# Patient Record
Sex: FEMALE | Race: WHITE | Employment: FULL TIME | URBAN - METROPOLITAN AREA
[De-identification: names, ages, dates, MRNs, and addresses within clinical notes are randomized per-mention and may not be internally consistent; named-entity substitution may affect disease eponyms.]

---

## 2017-03-08 ENCOUNTER — HOSPITAL ENCOUNTER (OUTPATIENT)
Dept: MAMMOGRAPHY | Age: 57
Discharge: HOME | End: 2017-03-08
Payer: COMMERCIAL

## 2017-03-08 DIAGNOSIS — Z12.31 VISIT FOR SCREENING MAMMOGRAM: ICD-10-CM

## 2017-03-08 PROCEDURE — 77067 SCR MAMMO BI INCL CAD: CPT

## 2017-03-08 PROCEDURE — 77063 BREAST TOMOSYNTHESIS BI: CPT

## 2017-03-15 DIAGNOSIS — E78.00 PURE HYPERCHOLESTEROLEMIA: ICD-10-CM

## 2017-03-15 DIAGNOSIS — F32.0 MILD SINGLE CURRENT EPISODE OF MAJOR DEPRESSIVE DISORDER (HCC): ICD-10-CM

## 2017-03-15 DIAGNOSIS — K58.0 IRRITABLE BOWEL SYNDROME WITH DIARRHEA: ICD-10-CM

## 2017-03-15 DIAGNOSIS — E11.21 CONTROLLED TYPE 2 DIABETES MELLITUS WITH DIABETIC NEPHROPATHY, WITHOUT LONG-TERM CURRENT USE OF INSULIN (HCC): Primary | ICD-10-CM

## 2017-03-15 LAB
ABSOLUTE LYMPHOCYTE COUNT, 10803: 2 K/UL (ref 1–4.8)
AVG GLU, 10930: 161 MG/DL (ref 91–123)
BASOPHILS # BLD: 0 K/UL (ref 0–0.2)
BASOPHILS NFR BLD: 0 % (ref 0–2)
BILIRUB UR QL: NEGATIVE
CREATININE, URINE: 108 MG/DL
CREATININE, URINE: 108 MG/DL
EOSINOPHIL # BLD: 0.2 K/UL (ref 0–0.5)
EOSINOPHIL NFR BLD: 2 % (ref 0–6)
EPITHELIAL,EPSU: ABNORMAL /HPF (ref 0–2)
ERYTHROCYTE [DISTWIDTH] IN BLOOD BY AUTOMATED COUNT: 13.6 % (ref 10–16)
GLUCOSE UR QL: NEGATIVE MG/DL
GRANULOCYTES,GRANS: 72 % (ref 40–75)
HBA1C MFR BLD HPLC: 7.2 % (ref 4.8–5.9)
HCT VFR BLD AUTO: 45.9 % (ref 35.1–48)
HGB BLD-MCNC: 14.4 G/DL (ref 11.7–16)
HGB UR QL STRIP: NEGATIVE
KETONES UR QL STRIP.AUTO: NEGATIVE MG/DL
LEUKOCYTE ESTERASE: ABNORMAL
LYMPHOCYTES, LYMLT: 20 % (ref 27–45)
MCH RBC QN AUTO: 31 PG (ref 26–34)
MCHC RBC AUTO-ENTMCNC: 31 G/DL (ref 32–36)
MCV RBC AUTO: 99 FL (ref 80–95)
MICROALB/CREAT RATIO, 140286: NORMAL MCG/MG OF CREATININE (ref 0–30)
MICROALBUMIN,URINE RANDOM 140054: <12 UG/ML (ref 0.1–17)
MONOCYTES # BLD: 0.7 K/UL (ref 0.1–0.9)
MONOCYTES NFR BLD: 7 % (ref 3–9)
NEUTROPHILS # BLD AUTO: 7.3 K/UL (ref 1.8–7.7)
NITRITE UR QL STRIP.AUTO: NEGATIVE
PH UR STRIP: 5 PH (ref 5–8)
PLATELET # BLD AUTO: 216 K/UL (ref 140–440)
PMV BLD AUTO: 12.2 FL (ref 6–10.8)
PROT UR QL STRIP: NEGATIVE MG/DL
RBC # BLD AUTO: 4.62 M/UL (ref 3.8–5.2)
RBC #/AREA URNS HPF: NEGATIVE /HPF
SP GR UR: 1.02 (ref 1–1.03)
UROBILINOGEN UR STRIP-MCNC: <2 MG/DL
WBC # BLD AUTO: 10.1 K/UL (ref 4–11)
WBC URNS QL MICRO: ABNORMAL /HPF (ref 0–2)

## 2017-03-16 ENCOUNTER — TELEPHONE (OUTPATIENT)
Dept: FAMILY MEDICINE CLINIC | Age: 57
End: 2017-03-16

## 2017-03-16 LAB
25(OH)D3 SERPL-MCNC: 32.7 NG/ML (ref 32–100)
A-G RATIO,AGRAT: 1.9 RATIO (ref 1.1–2.6)
ALBUMIN SERPL-MCNC: 4.6 G/DL (ref 3.5–5)
ALP SERPL-CCNC: 96 U/L (ref 25–115)
ALT SERPL-CCNC: 21 U/L (ref 5–40)
ANION GAP SERPL CALC-SCNC: 19 MMOL/L
AST SERPL W P-5'-P-CCNC: 16 U/L (ref 10–37)
BILIRUB SERPL-MCNC: 0.5 MG/DL (ref 0.2–1.2)
BUN SERPL-MCNC: 8 MG/DL (ref 6–22)
CALCIUM SERPL-MCNC: 9.6 MG/DL (ref 8.4–10.5)
CHLORIDE SERPL-SCNC: 103 MMOL/L (ref 98–110)
CHOLEST SERPL-MCNC: 154 MG/DL (ref 110–200)
CO2 SERPL-SCNC: 22 MMOL/L (ref 20–32)
CREAT SERPL-MCNC: 0.9 MG/DL (ref 0.5–1.2)
GFRAA, 66117: >60
GFRNA, 66118: >60
GLOBULIN,GLOB: 2.4 G/DL (ref 2–4)
GLUCOSE SERPL-MCNC: 192 MG/DL (ref 65–99)
HCV AB SER IA-ACNC: POSITIVE
HDLC SERPL-MCNC: 34 MG/DL (ref 40–59)
LDLC SERPL CALC-MCNC: 81 MG/DL (ref 50–99)
POTASSIUM SERPL-SCNC: 4.7 MMOL/L (ref 3.5–5.5)
PROT SERPL-MCNC: 7 G/DL (ref 6.4–8.3)
SODIUM SERPL-SCNC: 144 MMOL/L (ref 133–145)
TRIGL SERPL-MCNC: 194 MG/DL (ref 40–149)
TSH SERPL DL<=0.005 MIU/L-ACNC: 1.41 MCU/ML (ref 0.27–4.2)
VLDLC SERPL CALC-MCNC: 39 MG/DL (ref 8–30)

## 2017-03-16 NOTE — TELEPHONE ENCOUNTER
Notify pt of abnormal labs  Don't get into discussion  Just let her know to discuss with me next wk  Dr Lana Chadwick

## 2017-03-22 ENCOUNTER — HOSPITAL ENCOUNTER (OUTPATIENT)
Dept: GENERAL RADIOLOGY | Age: 57
Discharge: HOME | End: 2017-03-22
Attending: FAMILY MEDICINE
Payer: COMMERCIAL

## 2017-03-22 DIAGNOSIS — K58.0 IRRITABLE BOWEL SYNDROME WITH DIARRHEA: ICD-10-CM

## 2017-03-22 DIAGNOSIS — E78.00 PURE HYPERCHOLESTEROLEMIA: ICD-10-CM

## 2017-03-22 DIAGNOSIS — F32.0 MILD SINGLE CURRENT EPISODE OF MAJOR DEPRESSIVE DISORDER (HCC): ICD-10-CM

## 2017-03-22 DIAGNOSIS — E11.21 CONTROLLED TYPE 2 DIABETES MELLITUS WITH DIABETIC NEPHROPATHY, WITHOUT LONG-TERM CURRENT USE OF INSULIN (HCC): ICD-10-CM

## 2017-03-22 PROCEDURE — 77080 DXA BONE DENSITY AXIAL: CPT

## 2017-03-23 ENCOUNTER — OFFICE VISIT (OUTPATIENT)
Dept: FAMILY MEDICINE CLINIC | Age: 57
End: 2017-03-23

## 2017-03-23 VITALS
BODY MASS INDEX: 30.83 KG/M2 | SYSTOLIC BLOOD PRESSURE: 123 MMHG | RESPIRATION RATE: 16 BRPM | DIASTOLIC BLOOD PRESSURE: 73 MMHG | HEART RATE: 78 BPM | TEMPERATURE: 97.7 F | WEIGHT: 203.4 LBS | HEIGHT: 68 IN | OXYGEN SATURATION: 95 %

## 2017-03-23 DIAGNOSIS — B18.2 HEP C W/O COMA, CHRONIC (HCC): ICD-10-CM

## 2017-03-23 DIAGNOSIS — Z00.00 ROUTINE GENERAL MEDICAL EXAMINATION AT A HEALTH CARE FACILITY: Primary | ICD-10-CM

## 2017-03-23 DIAGNOSIS — F32.0 MILD SINGLE CURRENT EPISODE OF MAJOR DEPRESSIVE DISORDER (HCC): ICD-10-CM

## 2017-03-23 DIAGNOSIS — E11.9 CONTROLLED TYPE 2 DIABETES MELLITUS WITHOUT COMPLICATION, WITHOUT LONG-TERM CURRENT USE OF INSULIN (HCC): ICD-10-CM

## 2017-03-23 DIAGNOSIS — E78.00 PURE HYPERCHOLESTEROLEMIA: ICD-10-CM

## 2017-03-23 DIAGNOSIS — K58.9 IRRITABLE BOWEL SYNDROME WITHOUT DIARRHEA: ICD-10-CM

## 2017-03-23 RX ORDER — METFORMIN HYDROCHLORIDE 500 MG/1
500 TABLET ORAL 2 TIMES DAILY WITH MEALS
Qty: 180 TAB | Refills: 4 | Status: SHIPPED | OUTPATIENT
Start: 2017-03-23 | End: 2017-09-25 | Stop reason: SDUPTHER

## 2017-03-23 RX ORDER — SERTRALINE HYDROCHLORIDE 100 MG/1
100 TABLET, FILM COATED ORAL DAILY
Qty: 90 TAB | Refills: 4 | Status: SHIPPED | OUTPATIENT
Start: 2017-03-23 | End: 2017-09-25 | Stop reason: SDUPTHER

## 2017-03-23 RX ORDER — AMITRIPTYLINE HYDROCHLORIDE 50 MG/1
50 TABLET, FILM COATED ORAL
Qty: 90 TAB | Refills: 4 | Status: SHIPPED | OUTPATIENT
Start: 2017-03-23 | End: 2017-09-25 | Stop reason: SDUPTHER

## 2017-03-23 RX ORDER — RANITIDINE 150 MG/1
150 TABLET, FILM COATED ORAL 2 TIMES DAILY
Qty: 180 TAB | Refills: 4 | Status: SHIPPED | OUTPATIENT
Start: 2017-03-23 | End: 2017-09-25 | Stop reason: SDUPTHER

## 2017-03-23 RX ORDER — SIMVASTATIN 20 MG/1
20 TABLET, FILM COATED ORAL
Qty: 90 TAB | Refills: 4 | Status: SHIPPED | OUTPATIENT
Start: 2017-03-23 | End: 2017-09-25 | Stop reason: SDUPTHER

## 2017-03-23 NOTE — PROGRESS NOTES
Quiana Kee is a 60 y.o.  female and presents for a preventive health care visit   Subjective:  Health Maintenance History  Immunizations reviewed, utd  indicated. Mammogram : utd nl , dexascan: utd nl ,pap smear : utd ,   colonoscopy: utd , Eye exam: utd ,  Chest CT: na ,    HPI ;    Patient Active Problem List    Diagnosis Date Noted    Pure hypercholesterolemia 02/16/2015    Diabetes mellitus type 2, controlled ([facility] [address] 02/16/2015    IBS (irritable bowel syndrome) 02/16/2015    Depression 02/16/2015     Current Outpatient Prescriptions   Medication Sig Dispense Refill    metFORMIN (GLUCOPHAGE) 500 mg tablet Take 1 Tab by mouth two (2) times daily (with meals). 180 Tab 4    amitriptyline (ELAVIL) 50 mg tablet Take 1 Tab by mouth nightly. 90 Tab 4    sertraline (ZOLOFT) 100 mg tablet Take 1 Tab by mouth daily. 90 Tab 4    simvastatin (ZOCOR) 20 mg tablet Take 1 Tab by mouth nightly. 90 Tab 4    raNITIdine (ZANTAC) 150 mg tablet Take 1 Tab by mouth two (2) times a day. 180 Tab 4    progesterone (PROMETRIUM) 200 mg capsule Take 200 mg by mouth daily.  OTHER,NON-FORMULARY, Indications: testosterone and estradio pellets subQ       Allergies   Allergen Reactions    Aspirin Other (comments)     Bleeding       Past Medical History:   Diagnosis Date    Anxiety and depression     Arthritis     Depression     Depression 2/16/2015    Endometriosis     Pure hypercholesterolemia 2/16/2015    Sinus congestion      Past Surgical History:   Procedure Laterality Date    HX CHOLECYSTECTOMY      HX CHOLECYSTECTOMY      HX CYST INCISION AND DRAINAGE      rt 2010????      Family History   Problem Relation Age of Onset    Heart Disease Mother     Hypertension Father     Heart Disease Father     Diabetes Father     Cancer Paternal Grandfather     Breast Cancer Other      cousin and niece     Social History   Substance Use Topics    Smoking status: Former Smoker    Smokeless tobacco: Never Used    Alcohol use 1.5 oz/week     3 drink(s) per week       ROS       General: negative for - chills, fatigue, fever, weight change  Psych: negative for - anxiety, depression, irritability or mood swings  ENT: negative for - headaches, hearing change, nasal congestion, oral lesions, sneezing or sore throat  Heme/ Lymph: negative for - bleeding problems, bruising, pallor or swollen lymph nodes  Endo: negative for - hot flashes, polydipsia/polyuria or temperature intolerance  Resp: negative for - cough, shortness of breath or wheezing  CV: negative for - chest pain, edema or palpitations  GI: negative for - abdominal pain, change in bowel habits, constipation, diarrhea or nausea/vomiting  : negative for - dysuria, hematuria, incontinence, pelvic pain or vulvar/vaginal symptoms  MSK: negative for - joint pain, joint swelling or muscle pain  Neuro: negative for - confusion, headaches, seizures or weakness  Derm: negative for - dry skin, hair changes, rash or skin lesion changes      Objective:  Vitals:    03/23/17 0730   BP: 123/73   Pulse: 78   Resp: 16   Temp: 97.7 °F (36.5 °C)   TempSrc: Oral   SpO2: 95%   Weight: 203 lb 6.4 oz (92.3 kg)   Height: 5' 8\" (1.727 m)   PainSc:   0 - No pain       alert, well appearing, and in no distress, oriented to person, place, and time and normal appearing weight  Mental status - alert, oriented to person, place, and time  Eyes - pupils equal and reactive, extraocular eye movements intact  Ears - bilateral TM's and external ear canals normal  Nose - normal and patent, no erythema, discharge or polyps  Mouth - mucous membranes moist, pharynx normal without lesions  Neck - supple, no significant adenopathy  Lymphatics - no palpable lymphadenopathy, no hepatosplenomegaly  Chest - clear to auscultation, no wheezes, rales or rhonchi, symmetric air entry  Heart - normal rate, regular rhythm, normal S1, S2, no murmurs, rubs, clicks or gallops  Abdomen - soft, nontender, nondistended, no masses or organomegaly  Back exam - full range of motion, no tenderness, palpable spasm or pain on motion  Neurological - alert, oriented, normal speech, no focal findings or movement disorder noted  Musculoskeletal - no joint tenderness, deformity or swelling  Extremities - peripheral pulses normal, no pedal edema, no clubbing or cyanosis  Skin - normal coloration and turgor, no rashes, no suspicious skin lesions noted        LABS  Component      Latest Ref Rng & Units 3/15/2017 3/15/2017 3/15/2017           8:55 AM  8:55 AM  8:55 AM   WBC      4.0 - 11.0 K/uL      RBC      3.80 - 5.20 M/uL      HGB      11.7 - 16.0 g/dL      HCT      35.1 - 48.0 %      MCV      80 - 95 fL      MCH      26 - 34 pg      MCHC      32 - 36 g/dL      RDW      10.0 - 16.0 %      PLATELET      497 - 013 K/uL      MPV      6.0 - 10.8 fL      NEUTROPHILS      40 - 75 %      Lymphocytes      27 - 45 %      MONOCYTES      3 - 9 %      EOSINOPHILS      0 - 6 %      BASOPHILS      0 - 2 %      ABS. NEUTROPHILS      1.8 - 7.7 K/uL      ABSOLUTE LYMPHOCYTE COUNT      1.0 - 4.8 K/uL      ABS. MONOCYTES      0.1 - 0.9 K/uL      ABS. EOSINOPHILS      0.0 - 0.5 K/uL      ABS. BASOPHILS      0.0 - 0.2 K/uL      Glucose      65 - 99 mg/dL      BUN      6 - 22 mg/dL      Creatinine      0.5 - 1.2 mg/dL      Sodium      133 - 145 mmol/L      Potassium      3.5 - 5.5 mmol/L      Chloride      98 - 110 mmol/L      CO2      20 - 32 mmol/L      AST      10 - 37 U/L      ALT      5 - 40 U/L      Alk.  phosphatase      25 - 115 U/L      Bilirubin, total      0.2 - 1.2 mg/dL      Calcium      8.4 - 10.5 mg/dL      Protein, total      6.4 - 8.3 g/dL      Albumin      3.5 - 5.0 g/dL      A-G Ratio      1.1 - 2.6 ratio      Globulin      2.0 - 4.0 g/dL      Anion gap      mmol/L      GFRAA      >60.0      GFRNA      >60.0      Specific Gravity      1.005 - 1.03      pH (UA)      5.0 - 8.0 pH      Protein      Negative, mg/dL      Glucose      Negative mg/dL      Ketone Negative mg/dL      Bilirubin      Negative      Blood      Negative      Nitrites      Negative      Leukocyte Esterase      Negative      Urobilinogen      <2.0 mg/dL      WBC      0 - 2 /hpf      RBC      Negative, /hpf      Epithelial cells      0 - 2 /hpf      Triglyceride      40 - 149 mg/dL      HDL Cholesterol      40 - 59 mg/dL      Cholesterol, total      110 - 200 mg/dL      LDL, calculated      50 - 99 mg/dL      VLDL, calculated      8 - 30 mg/dL      Creatinine, urine      mg/dL      Microalbumin, urine      0.1 - 17.0 ug/mL      Microalbumin/Creat. Ratio      0.0 - 30.0 mcg/mg of Creatinine      Hemoglobin A1c, (calculated)      4.8 - 5.9 %      AVG GLU      91 - 123 mg/dL      Hep C Virus Ab      None Detec Positive (A)     VITAMIN D, 25-HYDROXY      32.0 - 100.0 ng/mL   32.7   TSH      0.27 - 4.20 mcU/mL  1.41      Component      Latest Ref Rng & Units 3/15/2017 3/15/2017 3/15/2017 3/15/2017           8:55 AM  8:55 AM  8:55 AM  8:55 AM   WBC      4.0 - 11.0 K/uL       RBC      3.80 - 5.20 M/uL       HGB      11.7 - 16.0 g/dL       HCT      35.1 - 48.0 %       MCV      80 - 95 fL       MCH      26 - 34 pg       MCHC      32 - 36 g/dL       RDW      10.0 - 16.0 %       PLATELET      165 - 573 K/uL       MPV      6.0 - 10.8 fL       NEUTROPHILS      40 - 75 %       Lymphocytes      27 - 45 %       MONOCYTES      3 - 9 %       EOSINOPHILS      0 - 6 %       BASOPHILS      0 - 2 %       ABS. NEUTROPHILS      1.8 - 7.7 K/uL       ABSOLUTE LYMPHOCYTE COUNT      1.0 - 4.8 K/uL       ABS. MONOCYTES      0.1 - 0.9 K/uL       ABS. EOSINOPHILS      0.0 - 0.5 K/uL       ABS.  BASOPHILS      0.0 - 0.2 K/uL       Glucose      65 - 99 mg/dL 192 (H)      BUN      6 - 22 mg/dL 8      Creatinine      0.5 - 1.2 mg/dL 0.9      Sodium      133 - 145 mmol/L 144      Potassium      3.5 - 5.5 mmol/L 4.7      Chloride      98 - 110 mmol/L 103      CO2      20 - 32 mmol/L 22      AST      10 - 37 U/L 16      ALT      5 - 40 U/L 21      Alk. phosphatase      25 - 115 U/L 96      Bilirubin, total      0.2 - 1.2 mg/dL 0.5      Calcium      8.4 - 10.5 mg/dL 9.6      Protein, total      6.4 - 8.3 g/dL 7.0      Albumin      3.5 - 5.0 g/dL 4.6      A-G Ratio      1.1 - 2.6 ratio 1.9      Globulin      2.0 - 4.0 g/dL 2.4      Anion gap      mmol/L 19.0      GFRAA      >60.0 >60.0      GFRNA      >60.0 >60.0      Specific Gravity      1.005 - 1.03       pH (UA)      5.0 - 8.0 pH       Protein      Negative, mg/dL       Glucose      Negative mg/dL       Ketone      Negative mg/dL       Bilirubin      Negative       Blood      Negative       Nitrites      Negative       Leukocyte Esterase      Negative       Urobilinogen      <2.0 mg/dL       WBC      0 - 2 /hpf       RBC      Negative, /hpf       Epithelial cells      0 - 2 /hpf       Triglyceride      40 - 149 mg/dL  194 (H)     HDL Cholesterol      40 - 59 mg/dL  34 (L)     Cholesterol, total      110 - 200 mg/dL  154     LDL, calculated      50 - 99 mg/dL  81     VLDL, calculated      8 - 30 mg/dL  39 (H)     Creatinine, urine      mg/dL   108 108   Microalbumin, urine      0.1 - 17.0 ug/mL   <12.0    Microalbumin/Creat.  Ratio      0.0 - 30.0 mcg/mg of Creatinine       Hemoglobin A1c, (calculated)      4.8 - 5.9 %       AVG GLU      91 - 123 mg/dL       Hep C Virus Ab      None Detec       VITAMIN D, 25-HYDROXY      32.0 - 100.0 ng/mL       TSH      0.27 - 4.20 mcU/mL         Component      Latest Ref Rng & Units 3/15/2017 3/15/2017 3/15/2017           8:55 AM  8:55 AM  8:55 AM   WBC      4.0 - 11.0 K/uL   10.1   RBC      3.80 - 5.20 M/uL   4.62   HGB      11.7 - 16.0 g/dL   14.4   HCT      35.1 - 48.0 %   45.9   MCV      80 - 95 fL   99 (H)   MCH      26 - 34 pg   31   MCHC      32 - 36 g/dL   31 (L)   RDW      10.0 - 16.0 %   13.6   PLATELET      066 - 667 K/uL   216   MPV      6.0 - 10.8 fL   12.2 (H)   NEUTROPHILS      40 - 75 %   72   Lymphocytes      27 - 45 %   20 (L)   MONOCYTES 3 - 9 %   7   EOSINOPHILS      0 - 6 %   2   BASOPHILS      0 - 2 %   0   ABS. NEUTROPHILS      1.8 - 7.7 K/uL   7.3   ABSOLUTE LYMPHOCYTE COUNT      1.0 - 4.8 K/uL   2.0   ABS. MONOCYTES      0.1 - 0.9 K/uL   0.7   ABS. EOSINOPHILS      0.0 - 0.5 K/uL   0.2   ABS. BASOPHILS      0.0 - 0.2 K/uL   0.0   Glucose      65 - 99 mg/dL      BUN      6 - 22 mg/dL      Creatinine      0.5 - 1.2 mg/dL      Sodium      133 - 145 mmol/L      Potassium      3.5 - 5.5 mmol/L      Chloride      98 - 110 mmol/L      CO2      20 - 32 mmol/L      AST      10 - 37 U/L      ALT      5 - 40 U/L      Alk. phosphatase      25 - 115 U/L      Bilirubin, total      0.2 - 1.2 mg/dL      Calcium      8.4 - 10.5 mg/dL      Protein, total      6.4 - 8.3 g/dL      Albumin      3.5 - 5.0 g/dL      A-G Ratio      1.1 - 2.6 ratio      Globulin      2.0 - 4.0 g/dL      Anion gap      mmol/L      GFRAA      >60.0      GFRNA      >60.0      Specific Gravity      1.005 - 1.03  1.018    pH (UA)      5.0 - 8.0 pH  5.0    Protein      Negative, mg/dL  Negative    Glucose      Negative mg/dL  Negative    Ketone      Negative mg/dL  Negative    Bilirubin      Negative  Negative    Blood      Negative  Negative    Nitrites      Negative  Negative    Leukocyte Esterase      Negative  Moderate (A)    Urobilinogen      <2.0 mg/dL  <2.0    WBC      0 - 2 /hpf  5-10 (A)    RBC      Negative, /hpf  Negative    Epithelial cells      0 - 2 /hpf  0-2    Triglyceride      40 - 149 mg/dL      HDL Cholesterol      40 - 59 mg/dL      Cholesterol, total      110 - 200 mg/dL      LDL, calculated      50 - 99 mg/dL      VLDL, calculated      8 - 30 mg/dL      Creatinine, urine      mg/dL      Microalbumin, urine      0.1 - 17.0 ug/mL      Microalbumin/Creat.  Ratio      0.0 - 30.0 mcg/mg of Creatinine      Hemoglobin A1c, (calculated)      4.8 - 5.9 % 7.2 (H)     AVG GLU      91 - 123 mg/dL 161 (H)     Hep C Virus Ab      None Detec      VITAMIN D, 25-HYDROXY      32.0 - 100.0 ng/mL      TSH      0.27 - 4.20 mcU/mL        TESTS    ekg with L BBB    Assessment/Plan:    Health Maintenance up to date. Recommend f/u physical 1 year. Routine screening labs/tests recommended prior to next physical.              I have discussed the diagnosis with the patient and the intended plan as seen in the above orders. The patient has received an after-visit summary and questions were answered concerning future plans. I have discussed medication side effects and warnings with the patient as well. I have reviewed the plan of care with the patient, accepted their input and they are in agreement with the treatment goals. Follow-up Disposition:  Return in about 6 months (around 9/23/2017) for rov, [facility] next visit.        -------------------------------------------------------------------------------------------------------------------    Problem Assessment  and also with   Chief Complaint   Patient presents with    Diabetes    Cholesterol Problem    Irritable Bowel Syndrome    Depression         HPI ;  Cardiovascular Review:  The patient has diabetes, hypertension and hyperlipidemia. Diet and Lifestyle: not attempting to follow a low fat, low cholesterol diet, not attempting to follow a low sodium diet  Home BP Monitoring: is not measured at home. Pertinent ROS: taking medications as instructed, no medication side effects noted, no TIA's, no chest pain on exertion, no dyspnea on exertion, no swelling of ankles. Diabetes Mellitus:  She has diabetes mellitus, and  diabetes, hypertension and hyperlipidemia. Diabetic ROS - diabetic diet compliance: compliant all of the time. Lab review: labs are reviewed, up to date and normal.   Depression Review:  Patient is seen for followup of depression. Treatment includes Zoloft and no other therapies. Ongoing symptoms include depressed mood. She denies depressed mood. She experiences the following side effects from the treatment: none.   Hep C  New diagnosis   Additional Concerns: IBS on meds doing well               Assessment/Plan:      Diabetes - well controlled  Hyperlipidemia - well controlled  IBS  Stable  Depression doing well  Hep C new diagnosis had long d/w her today will refer to [facility] for angie          Lab review: labs are reviewed, up to date and normal, orders written for new lab studies as appropriate; see orders

## 2017-03-23 NOTE — PATIENT INSTRUCTIONS
Hepatitis C: Care Instructions  Your Care Instructions  Hepatitis C is an infection of the liver caused by a virus. This virus spreads when blood or body fluids from an infected person enter another person's body. This occurs most often when people share needles that have the virus on them. In the past, people got the virus through blood transfusions and organ transplants. But since 1992, all donated blood and organs have been screened for hepatitis C. So getting the virus this way is now very rare. Less often, hepatitis C can spread through sex and sharing items such as razor blades or toothbrushes. Needles used for tattoos and body piercings can also spread the virus. The virus doesn't always cause symptoms. But you may feel tired. And you may have a headache, sore muscles, nausea, and pain in the upper right belly. Other symptoms include yellowish skin and dark urine. Home treatment can help ease symptoms. And your doctor may prescribe antiviral medicine. Long-term infection can lead to severe liver damage. So make sure to go to your follow-up appointments. Follow-up care is a key part of your treatment and safety. Be sure to make and go to all appointments, and call your doctor if you are having problems. It's also a good idea to know your test results and keep a list of the medicines you take. How can you care for yourself at home? · Be safe with medicines. If your doctor prescribes antiviral medicine, take it exactly as prescribed. Call your doctor if you think you are having a problem with your medicine. · Do not drink alcohol. Alcohol can damage the liver. Tell your doctor if you need help to quit. Counseling, support groups, and sometimes medicines can help you stay sober. · Do not take drugs or herbal medicines. They can make liver problems worse. · Make sure your doctor knows all of the medicines you take. Some medicines, such as acetaminophen (Tylenol), can make liver problems worse.  Do not take any new medicines unless your doctor tells you to. This includes over-the-counter medicines. · Maintain a healthy lifestyle. Get plenty of exercise if you feel up to it. Eat a healthy diet. · Drink plenty of fluids, enough so that your urine is light yellow or clear like water. If you have kidney, heart, or liver disease and have to limit fluids, talk with your doctor before you increase the amount of fluids you drink. · Get the vaccines (if you have not already) to protect yourself from hepatitis A and hepatitis B, influenza, and pneumococcus. · The infection can make you itch. Keep cool and stay out of the sun. Try to wear cotton clothing. Talk to your doctor about using over-the-counter medicines for itching. These include diphenhydramine (Benadryl) and chlorpheniramine (Chlor-Trimeton). Follow the instructions on the label. · If you feel depressed, talk to your doctor about treatment. Many people who have long-term illnesses get depressed. Keep in mind that antiviral medicine can make depression worse. To avoid spreading hepatitis C to others  · Tell the people that you live with or have sex with about your illness as soon as you can. · Don't share needles to inject drugs. Don't share other equipment (such as cotton, spoons, and water) with others. Find out if a needle exchange program is available in your area, and use it. Get into a drug treatment program.  · Practice safer sex. Reduce your number of sex partners if you have more than one. Unless you are in a long-term relationship in which neither partner has sex with anyone else, always use latex condoms when you have sex. · Don't donate blood or blood products, organs, semen, or eggs (ova). · Make sure that all equipment is sterilized if you get a tattoo, have your body pierced, or have acupuncture. · Do not share your personal items. These include razors, toothbrushes, towels, and nail files.   · Tell your doctor, dentist, and anyone else who may come in contact with your blood about your illness. · Prevent others from coming in contact with your blood and other body fluids. Keep any cuts, scrapes, or blisters covered. · Wash your handsand any object that has come in contact with your bloodthoroughly with water and soap. When should you call for help? Call 911 anytime you think you may need emergency care. For example, call if:  · You passed out (lost consciousness). · You have severe trouble breathing. · You feel very confused and can't think clearly. · You vomit blood or what looks like coffee grounds. · You pass maroon or very bloody stools. Call your doctor now or seek immediate medical care if:  · You have any trouble breathing. · You have new bruises or blood spots under your skin. · Your stools are black and tarlike or have streaks of blood. · You have signs of needing more fluids. You have sunken eyes and a dry mouth, and you pass only a little dark urine. Watch closely for changes in your health, and be sure to contact your doctor if:  · You have a nosebleed. · Your gums bleed when you brush your teeth. Where can you learn more? Go to http://brandon-[email or web address].info/. Enter M169 in the search box to learn more about \"Hepatitis C: Care Instructions. \"  Current as of: May 24, 2016  Content Version: 11.1  © 9525-2341 [employer]. Care instructions adapted under license by [employer] (which disclaims liability or warranty for this information). If you have questions about a medical condition or this instruction, always ask your healthcare professional. [place] [address] any warranty or liability for your use of this information. Learning About Hepatitis C  What is hepatitis C? Hepatitis C is a liver infection. It is caused by the hepatitis C virus. The virus is spread through infected blood and body fluids.   Hepatitis C is often spread when a person shares infected needles used to inject illegal drugs. It also can be spread if a person uses a needle that has infected blood on it. This could happen when you get a tattoo or piercing. Or it can happen when you get a shot in some developing countries where they use needles more than once to give shots. In rare cases, a mother with hepatitis C can spread the virus to her baby at birth. Or a health care worker may accidentally be exposed to blood that is infected with hepatitis C. There is a very small risk of getting the virus through sexual contact. The risk is higher if your sex partner also has HIV or another sexually transmitted infection, or if you have many sex partners. You can't get hepatitis C from casual contact. This is contact such as hugging, kissing, sneezing, coughing, and sharing food or drinks. What happens when you have hepatitis C? Some people who get hepatitis C have it for a short time and then get better. This is called acute hepatitis C. But most people get long-term, or chronic, hepatitis C. This can lead to liver damage as well as cirrhosis, liver cancer, and liver failure. Experts recommend that certain groups of people get tested for the virus. These include people who have signs of liver disease or have ever shared needles while using illegal drugs. Ask your doctor if testing is right for you. You can also buy a home test called a Home Access Hepatitis C Check kit at most drugstores. If the test shows that you have been exposed to the virus in the past, be sure to talk to your doctor to find out if you have the virus now. What are the symptoms? Most people who get hepatitis C do not have symptoms at first. Symptoms may include:  · Tiredness. · Headache. · Sore muscles. · Nausea. · Pain in the upper right belly. · Yellowing of your skin and eyes (jaundice). · Dark urine. How can you prevent hepatitis C? There is no vaccine to prevent the disease.  Anyone who has hepatitis C can spread the virus to someone else. You can take steps to make infection less likely. · Do not share needles to inject drugs. · Follow safety guidelines if you work in a health care setting. Wear protective gloves and clothing. Dispose of needles and other sharp objects properly. · Make sure all instruments and supplies are sterilized if you get a tattoo, have your body pierced, or have acupuncture. To avoid spreading hepatitis C if you have it:  · Do not share needles or other equipment, such as cotton, spoons, and water, if you use needles to inject drugs. · Keep cuts, scrapes, and blisters covered. This will prevent others from coming in contact with your blood and other body fluids. Throw out any blood-soaked items such as used bandages. · Do not donate blood or sperm. · Wash your hands and anything that has come in contact with your blood. Use soap and water. · Do not share your toothbrush, razor, nail clippers, or anything else that might have your blood on it. · Use latex condoms during sex if you have HIV, multiple sex partners, or a sexually transmitted infection. How is hepatitis C treated? · If you have acute hepatitis C, your doctor will probably prescribe medicine. · If you have chronic hepatitis C, your treatment depends on whether you have liver damage, other health problems you may have, and how much virus is in your body and what type it is. · You will need to see your doctor regularly to have blood tests to check your liver. Follow-up care is a key part of your treatment and safety. Be sure to make and go to all appointments, and call your doctor if you are having problems. It's also a good idea to know your test results and keep a list of the medicines you take. Where can you learn more? Go to http://brandon-[email or web address].info/.   Enter C666 in the search box to learn more about \"Learning About Hepatitis C.\"  Current as of: May 24, 2016  Content Version: 11.1  © 9542-9368 [employer]. Care instructions adapted under license by [employer] (which disclaims liability or warranty for this information). If you have questions about a medical condition or this instruction, always ask your healthcare professional. [address] any warranty or liability for your use of this information.

## 2017-03-27 LAB
HCV PCR LOG10, 20021: <1.18 {LOG_IU}/ML
HEPATITIS C RNA-PCR, 550401: <15 IU/ML

## 2017-09-25 ENCOUNTER — OFFICE VISIT (OUTPATIENT)
Dept: FAMILY MEDICINE CLINIC | Age: 57
End: 2017-09-25

## 2017-09-25 VITALS
WEIGHT: 190 LBS | HEART RATE: 69 BPM | HEIGHT: 68 IN | BODY MASS INDEX: 28.79 KG/M2 | DIASTOLIC BLOOD PRESSURE: 67 MMHG | TEMPERATURE: 96.2 F | SYSTOLIC BLOOD PRESSURE: 110 MMHG | RESPIRATION RATE: 16 BRPM | OXYGEN SATURATION: 94 %

## 2017-09-25 DIAGNOSIS — B18.2 HEP C W/O COMA, CHRONIC (HCC): ICD-10-CM

## 2017-09-25 DIAGNOSIS — E11.9 CONTROLLED TYPE 2 DIABETES MELLITUS WITHOUT COMPLICATION, WITHOUT LONG-TERM CURRENT USE OF INSULIN (HCC): Primary | ICD-10-CM

## 2017-09-25 DIAGNOSIS — K58.9 IRRITABLE BOWEL SYNDROME WITHOUT DIARRHEA: ICD-10-CM

## 2017-09-25 DIAGNOSIS — Z23 ENCOUNTER FOR IMMUNIZATION: ICD-10-CM

## 2017-09-25 DIAGNOSIS — E78.00 PURE HYPERCHOLESTEROLEMIA: ICD-10-CM

## 2017-09-25 DIAGNOSIS — F32.0 MILD SINGLE CURRENT EPISODE OF MAJOR DEPRESSIVE DISORDER (HCC): ICD-10-CM

## 2017-09-25 LAB — HBA1C MFR BLD HPLC: 5.8 %

## 2017-09-25 RX ORDER — RANITIDINE 150 MG/1
150 TABLET, FILM COATED ORAL 2 TIMES DAILY
Qty: 180 TAB | Refills: 4 | Status: SHIPPED | OUTPATIENT
Start: 2017-09-25 | End: 2018-04-26 | Stop reason: SDUPTHER

## 2017-09-25 RX ORDER — SIMVASTATIN 20 MG/1
20 TABLET, FILM COATED ORAL
Qty: 90 TAB | Refills: 4 | Status: SHIPPED | OUTPATIENT
Start: 2017-09-25 | End: 2018-04-26 | Stop reason: SDUPTHER

## 2017-09-25 RX ORDER — METFORMIN HYDROCHLORIDE 500 MG/1
500 TABLET ORAL 2 TIMES DAILY WITH MEALS
Qty: 180 TAB | Refills: 4 | Status: SHIPPED | OUTPATIENT
Start: 2017-09-25 | End: 2018-04-26 | Stop reason: SDUPTHER

## 2017-09-25 RX ORDER — AMITRIPTYLINE HYDROCHLORIDE 50 MG/1
50 TABLET, FILM COATED ORAL
Qty: 90 TAB | Refills: 4 | Status: SHIPPED | OUTPATIENT
Start: 2017-09-25 | End: 2018-04-26 | Stop reason: SDUPTHER

## 2017-09-25 RX ORDER — SERTRALINE HYDROCHLORIDE 100 MG/1
100 TABLET, FILM COATED ORAL DAILY
Qty: 90 TAB | Refills: 4 | Status: SHIPPED | OUTPATIENT
Start: 2017-09-25 | End: 2018-04-26 | Stop reason: SDUPTHER

## 2017-09-25 NOTE — PROGRESS NOTES
Ramakrishna Juarez is a 59 y.o.  female and presents with    Chief Complaint   Patient presents with    Diabetes    Cholesterol Problem    Depression           Subjective:    Cardiovascular Review:  The patient has diabetes, hypertension and hyperlipidemia. Diet and Lifestyle: not attempting to follow a low fat, low cholesterol diet, not attempting to follow a low sodium diet  Home BP Monitoring: is not measured at home. Pertinent ROS: taking medications as instructed, no medication side effects noted, no TIA's, no chest pain on exertion, no dyspnea on exertion, no swelling of ankles. Diabetes Mellitus:  She has diabetes mellitus, and  diabetes, hypertension and hyperlipidemia. Diabetic ROS - diabetic diet compliance: compliant most of the time. Lab review: labs are reviewed, up to date and normal.   Depression Review:  Patient is seen for followup of depression. Treatment includes Zoloft and no other therapies. Ongoing symptoms include depressed mood. She denies depressed mood. She experiences the following side effects from the treatment: none. Additional Concerns:          Patient Active Problem List    Diagnosis Date Noted    [facility] 03/23/2017    Pure hypercholesterolemia 02/16/2015    Diabetes mellitus type 2, controlled ([facility] 02/16/2015    IBS (irritable bowel syndrome) 02/16/2015    Depression 02/16/2015     Current Outpatient Prescriptions   Medication Sig Dispense Refill    metFORMIN (GLUCOPHAGE) 500 mg tablet Take 1 Tab by mouth two (2) times daily (with meals). 180 Tab 4    amitriptyline (ELAVIL) 50 mg tablet Take 1 Tab by mouth nightly. 90 Tab 4    sertraline (ZOLOFT) 100 mg tablet Take 1 Tab by mouth daily. 90 Tab 4    simvastatin (ZOCOR) 20 mg tablet Take 1 Tab by mouth nightly. 90 Tab 4    raNITIdine (ZANTAC) 150 mg tablet Take 1 Tab by mouth two (2) times a day.  180 Tab 4    progesterone (PROMETRIUM) 200 mg capsule Take 200 mg by mouth daily.     Mackenzie Jimenez, Indications: testosterone and estradio pellets subQ       Allergies   Allergen Reactions    Aspirin Other (comments)     Bleeding       Past Medical History:   Diagnosis Date    Anxiety and depression     Arthritis     Depression     Depression 2/16/2015    Endometriosis     Pure hypercholesterolemia 2/16/2015    Sinus congestion      Past Surgical History:   Procedure Laterality Date    HX CHOLECYSTECTOMY      HX CHOLECYSTECTOMY      HX CYST INCISION AND DRAINAGE      rt 2010???? Family History   Problem Relation Age of Onset    Heart Disease Mother     Hypertension Father     Heart Disease Father     Diabetes Father     Cancer Paternal Grandfather     Breast Cancer Other      cousin and niece     Social History   Substance Use Topics    Smoking status: Former Smoker    Smokeless tobacco: Never Used    Alcohol use 1.5 oz/week     3 drink(s) per week       ROS       All other systems reviewed and are negative.       Objective:  Vitals:    09/25/17 0739   BP: 110/67   Pulse: 69   Resp: 16   Temp: 96.2 °F (35.7 °C)   TempSrc: Oral   SpO2: 94%   Weight: 190 lb (86.2 kg)   Height: 5' 8\" (1.727 m)   PainSc:   0 - No pain                 alert, well appearing, and in no distress, oriented to person, place, and time and normal appearing weight  Chest - clear to auscultation, no wheezes, rales or rhonchi, symmetric air entry  Heart - normal rate, regular rhythm, normal S1, S2, no murmurs, rubs, clicks or gallops  Abdomen - soft, nontender, nondistended, no masses or organomegaly  Diabetic foot exam:     Left: Reflexes 2+     Vibratory sensation normal    Proprioception normal   Sharp/dull discrimination normal    Filament test normal sensation with micro filament   Pulse DP: 2+ (normal)   Pulse PT: 2+ (normal)   Deformities: None  Right: Reflexes 2+   Vibratory sensation normal   Proprioception normal   Sharp/dull discrimination normal   Filament test normal sensation with micro filament   Pulse DP: 2+ (normal)   Pulse PT: 2+ (normal)   Deformities: None          LABS   aic 5.8  TESTS      Assessment/Plan:    Diabetes - well controlled, stable  Hyperlipidemia - well controlled, stable  Depression stable      Lab review: labs are reviewed, up to date and normal    Diagnoses and all orders for this visit:    1. Controlled type 2 diabetes mellitus without complication, without long-term current use of insulin (HCC)  -     AMB POC HEMOGLOBIN A1C  -     metFORMIN (GLUCOPHAGE) 500 mg tablet; Take 1 Tab by mouth two (2) times daily (with meals). -     amitriptyline (ELAVIL) 50 mg tablet; Take 1 Tab by mouth nightly. -     sertraline (ZOLOFT) 100 mg tablet; Take 1 Tab by mouth daily. -     simvastatin (ZOCOR) 20 mg tablet; Take 1 Tab by mouth nightly. -     raNITIdine (ZANTAC) 150 mg tablet; Take 1 Tab by mouth two (2) times a day. -      DIABETES EYE EXAM  -      DIABETES FOOT EXAM    2. Encounter for immunization  -     Influenza virus vaccine (QUADRIVALENT PRES FREE SYRINGE) IM ([id number])  -      DIABETES EYE EXAM  -      DIABETES FOOT EXAM    3. Pure hypercholesterolemia  -     metFORMIN (GLUCOPHAGE) 500 mg tablet; Take 1 Tab by mouth two (2) times daily (with meals). -     amitriptyline (ELAVIL) 50 mg tablet; Take 1 Tab by mouth nightly. -     sertraline (ZOLOFT) 100 mg tablet; Take 1 Tab by mouth daily. -     simvastatin (ZOCOR) 20 mg tablet; Take 1 Tab by mouth nightly. -     raNITIdine (ZANTAC) 150 mg tablet; Take 1 Tab by mouth two (2) times a day. -      DIABETES EYE EXAM  -      DIABETES FOOT EXAM    4. Irritable bowel syndrome without diarrhea  -     metFORMIN (GLUCOPHAGE) 500 mg tablet; Take 1 Tab by mouth two (2) times daily (with meals). -     amitriptyline (ELAVIL) 50 mg tablet; Take 1 Tab by mouth nightly. -     sertraline (ZOLOFT) 100 mg tablet; Take 1 Tab by mouth daily. -     simvastatin (ZOCOR) 20 mg tablet; Take 1 Tab by mouth nightly.   - raNITIdine (ZANTAC) 150 mg tablet; Take 1 Tab by mouth two (2) times a day. -      DIABETES EYE EXAM  -      DIABETES FOOT EXAM    5. Mild single current episode of major depressive disorder (HCC)  -     metFORMIN (GLUCOPHAGE) 500 mg tablet; Take 1 Tab by mouth two (2) times daily (with meals). -     amitriptyline (ELAVIL) 50 mg tablet; Take 1 Tab by mouth nightly. -     sertraline (ZOLOFT) 100 mg tablet; Take 1 Tab by mouth daily. -     simvastatin (ZOCOR) 20 mg tablet; Take 1 Tab by mouth nightly. -     raNITIdine (ZANTAC) 150 mg tablet; Take 1 Tab by mouth two (2) times a day. -      DIABETES EYE EXAM  -      DIABETES FOOT EXAM    6. Routine general medical examination at a health care facility  -     metFORMIN (GLUCOPHAGE) 500 mg tablet; Take 1 Tab by mouth two (2) times daily (with meals). -     amitriptyline (ELAVIL) 50 mg tablet; Take 1 Tab by mouth nightly. -     sertraline (ZOLOFT) 100 mg tablet; Take 1 Tab by mouth daily. -     simvastatin (ZOCOR) 20 mg tablet; Take 1 Tab by mouth nightly. -     raNITIdine (ZANTAC) 150 mg tablet; Take 1 Tab by mouth two (2) times a day. -      DIABETES EYE EXAM  -      DIABETES FOOT EXAM    7. Hep C w/o coma, chronic (HCC)  -     metFORMIN (GLUCOPHAGE) 500 mg tablet; Take 1 Tab by mouth two (2) times daily (with meals). -     amitriptyline (ELAVIL) 50 mg tablet; Take 1 Tab by mouth nightly. -     sertraline (ZOLOFT) 100 mg tablet; Take 1 Tab by mouth daily. -     simvastatin (ZOCOR) 20 mg tablet; Take 1 Tab by mouth nightly. -     raNITIdine (ZANTAC) 150 mg tablet; Take 1 Tab by mouth two (2) times a day. -      DIABETES EYE EXAM  -      DIABETES FOOT EXAM          I have discussed the diagnosis with the patient and the intended plan as seen in the above orders. The patient has received an after-visit summary and questions were answered concerning future plans. I have discussed medication side effects and warnings with the patient as well. I have reviewed the plan of care with the patient, accepted their input and they are in agreement with the treatment goals.      Follow-up Disposition: Not on File

## 2017-09-25 NOTE — MR AVS SNAPSHOT
Visit Information Date & Time Provider Department Dept. Phone Encounter #  
 9/25/2017  7:30 AM Melissa Vela., [address] [facility] [phone] [id number] Follow-up Instructions Return in about 6 months (around 3/25/2018) for physical, labs prior, mammo prior, dexa prior, EKG next visit. Upcoming Health Maintenance Date Due  
 EYE EXAM RETINAL OR DILATED Q1 4/20/2016 FOOT EXAM Q1 3/15/2017 INFLUENZA AGE 9 TO ADULT 8/1/2017 HEMOGLOBIN A1C Q6M 9/15/2017 MICROALBUMIN Q1 3/15/2018 LIPID PANEL Q1 3/15/2018 PAP AKA CERVICAL CYTOLOGY 3/17/2018 BREAST CANCER SCRN MAMMOGRAM 3/8/2019 DTaP/Tdap/Td series (2 - Td) 3/17/2025 COLONOSCOPY 3/17/2025 Allergies as of 9/25/2017  Review Complete On: 9/25/2017 By: Melissa Vela., DO Severity Noted Reaction Type Reactions Aspirin  02/16/2015    Other (comments) Bleeding Current Immunizations  Reviewed on 3/15/2016 Name Date Influenza Vaccine 3/15/2016 Influenza Vaccine (Quad) PF  Incomplete Pneumococcal Polysaccharide (PPSV-23) 3/17/2015 Tdap 3/17/2015 Not reviewed this visit You Were Diagnosed With   
  
 Codes Comments Controlled type 2 diabetes mellitus without complication, without long-term current use of insulin ([facility]    -  Primary ICD-10-CM: E11.9 ICD-9-CM: 250.00 Encounter for immunization     ICD-10-CM: D07 ICD-9-CM: V03.89 Pure hypercholesterolemia     ICD-10-CM: E78.00 ICD-9-CM: 272.0 Irritable bowel syndrome without diarrhea     ICD-10-CM: K58.9 ICD-9-CM: 425.7 Mild single current episode of major depressive disorder (HCC)     ICD-10-CM: F32.0 ICD-9-CM: 296.21 Hep C w/o coma, chronic (HCC)     ICD-10-CM: B18.2 ICD-9-CM: 070.54 Vitals BP Pulse Temp Resp Height(growth percentile) Weight(growth percentile)  110/67 (BP 1 Location: Left arm, BP Patient Position: Sitting) 69 96.2 °F (35.7 °C) (Oral) 16 5' 8\" (1.727 m) 190 lb (86.2 kg) SpO2 BMI OB Status Smoking Status 94% 28.89 kg/m2 Menopause Former Smoker BMI and BSA Data Body Mass Index Body Surface Area  
 28.89 kg/m 2 2.03 m 2 Your Updated Medication List  
  
   
This list is accurate as of: 9/25/17  7:56 AM.  Always use your most recent med list.  
  
  
  
  
 amitriptyline 50 mg tablet Commonly known as:  ELAVIL Take 1 Tab by mouth nightly. metFORMIN 500 mg tablet Commonly known as:  GLUCOPHAGE Take 1 Tab by mouth two (2) times daily (with meals). OTHER(NON-FORMULARY) Indications: testosterone and estradio pellets subQ  
  
 progesterone 200 mg capsule Commonly known as:  PROMETRIUM Take 200 mg by mouth daily. raNITIdine 150 mg tablet Commonly known as:  ZANTAC Take 1 Tab by mouth two (2) times a day. sertraline 100 mg tablet Commonly known as:  ZOLOFT Take 1 Tab by mouth daily. simvastatin 20 mg tablet Commonly known as:  ZOCOR Take 1 Tab by mouth nightly. Prescriptions Printed Refills  
 metFORMIN (GLUCOPHAGE) 500 mg tablet 4 Sig: Take 1 Tab by mouth two (2) times daily (with meals). Class: Print Route: Oral  
 amitriptyline (ELAVIL) 50 mg tablet 4 Sig: Take 1 Tab by mouth nightly. Class: Print Route: Oral  
 sertraline (ZOLOFT) 100 mg tablet 4 Sig: Take 1 Tab by mouth daily. Class: Print Route: Oral  
 simvastatin (ZOCOR) 20 mg tablet 4 Sig: Take 1 Tab by mouth nightly. Class: Print Route: Oral  
 raNITIdine (ZANTAC) 150 mg tablet 4 Sig: Take 1 Tab by mouth two (2) times a day. Class: Print Route: Oral  
  
We Performed the Following AMB POC HEMOGLOBIN A1C [89650 CPT(R)]  DIABETES EYE EXAM [6 Custom]  DIABETES FOOT EXAM [7 Custom] INFLUENZA VIRUS VAC QUAD,SPLIT,PRESV FREE SYRINGE IM [id number] CPT(R)] Follow-up Instructions Return in about 6 months (around 3/25/2018) for physical, labs prior, mammo prior, dexa prior, EKG next visit. To-Do List   
 Around 03/24/2018 Lab:  CBC WITH AUTOMATED DIFF   
  
 03/24/2018 Imaging:  DEXA BONE DENSITY STUDY AXIAL   
  
 03/24/2018 Lab:  HEMOGLOBIN A1C WITH EAG Around 03/24/2018 Lab:  LIPID PANEL   
  
 03/24/2018 Imaging:  CLARKE MAMMO BI SCREENING INCL CAD Around 03/24/2018 Lab:  METABOLIC PANEL, COMPREHENSIVE   
  
 03/24/2018 Lab:  MICROALBUMIN, UR, RAND W/ MICROALBUMIN/CREA RATIO Around 03/24/2018 Lab:  TSH 3RD GENERATION Around 03/24/2018 Lab:  URINALYSIS W/ RFLX MICROSCOPIC Referral Information Referral ID Referred By Referred To  
  
 [id number] Juana CARTER Not Available Visits Status Start Date End Date 1 New Request 9/25/17 9/25/18 If your referral has a status of pending review or denied, additional information will be sent to support the outcome of this decision. Introducing [facility]! Dear Ed Stanley: Thank you for requesting a HackMyPic account. Our records indicate that you already have an active HackMyPic account. You can access your account anytime at [email or web address] Did you know that you can access your hospital and ER discharge instructions at any time in HackMyPic? You can also review all of your test results from your hospital stay or ER visit. Additional Information If you have questions, please visit the Frequently Asked Questions section of the HackMyPic website at [email or web address] Remember, HackMyPic is NOT to be used for urgent needs. For medical emergencies, dial 911. Now available from your iPhone and Android! Please provide this summary of care documentation to your next provider. Your primary care clinician is listed as [id number] [facility].  If you have any questions after today's visit, please call [phone].

## 2017-09-25 NOTE — PROGRESS NOTES
Aisha Brewer is a 59 y.o. female who presents for routine immunizations. She denies any symptoms , reactions or allergies that would exclude them from being immunized today. Risks and adverse reactions were discussed and the VIS was given to them. All questions were addressed. She was observed for 10 min post injection. There were no reactions observed. Kiki Alvares LPN      Aisha Brewer is a 59 y.o. female presents today for follow up on her Hepatitis C and diabetes. Pt is in Room # 4        1. Have you been to the ER, urgent care clinic since your last visit? Hospitalized since your last visit? No    2. Have you seen or consulted any other health care providers outside of the [address] [place] [address] since your last visit? Include any pap smears or colon screening.  No

## 2018-03-09 ENCOUNTER — HOSPITAL ENCOUNTER (OUTPATIENT)
Dept: GENERAL RADIOLOGY | Age: 58
Discharge: HOME | End: 2018-03-09
Attending: FAMILY MEDICINE
Payer: COMMERCIAL

## 2018-03-09 ENCOUNTER — HOSPITAL ENCOUNTER (OUTPATIENT)
Dept: MAMMOGRAPHY | Age: 58
Discharge: HOME | End: 2018-03-09
Attending: FAMILY MEDICINE
Payer: COMMERCIAL

## 2018-03-09 DIAGNOSIS — Z23 ENCOUNTER FOR IMMUNIZATION: ICD-10-CM

## 2018-03-09 DIAGNOSIS — K58.9 IRRITABLE BOWEL SYNDROME WITHOUT DIARRHEA: ICD-10-CM

## 2018-03-09 DIAGNOSIS — B18.2 HEP C W/O COMA, CHRONIC (HCC): ICD-10-CM

## 2018-03-09 DIAGNOSIS — F32.0 MILD SINGLE CURRENT EPISODE OF MAJOR DEPRESSIVE DISORDER (HCC): ICD-10-CM

## 2018-03-09 DIAGNOSIS — E11.9 CONTROLLED TYPE 2 DIABETES MELLITUS WITHOUT COMPLICATION, WITHOUT LONG-TERM CURRENT USE OF INSULIN (HCC): ICD-10-CM

## 2018-03-09 DIAGNOSIS — E78.00 PURE HYPERCHOLESTEROLEMIA: ICD-10-CM

## 2018-03-09 PROCEDURE — 77063 BREAST TOMOSYNTHESIS BI: CPT

## 2018-03-16 LAB
AVG GLU, 10930: 126 MG/DL (ref 91–123)
HBA1C MFR BLD HPLC: 6 % (ref 4.8–5.9)

## 2018-04-26 ENCOUNTER — OFFICE VISIT (OUTPATIENT)
Dept: FAMILY MEDICINE CLINIC | Age: 58
End: 2018-04-26

## 2018-04-26 VITALS
HEART RATE: 74 BPM | TEMPERATURE: 95.7 F | BODY MASS INDEX: 28.89 KG/M2 | DIASTOLIC BLOOD PRESSURE: 79 MMHG | OXYGEN SATURATION: 96 % | HEIGHT: 68 IN | SYSTOLIC BLOOD PRESSURE: 128 MMHG | WEIGHT: 190.6 LBS | RESPIRATION RATE: 19 BRPM

## 2018-04-26 DIAGNOSIS — E11.21 TYPE 2 DIABETES WITH NEPHROPATHY (HCC): ICD-10-CM

## 2018-04-26 DIAGNOSIS — E11.22 CONTROLLED TYPE 2 DIABETES MELLITUS WITH STAGE 1 CHRONIC KIDNEY DISEASE, WITHOUT LONG-TERM CURRENT USE OF INSULIN (HCC): ICD-10-CM

## 2018-04-26 DIAGNOSIS — K58.9 IRRITABLE BOWEL SYNDROME WITHOUT DIARRHEA: ICD-10-CM

## 2018-04-26 DIAGNOSIS — B18.2 HEP C W/O COMA, CHRONIC (HCC): ICD-10-CM

## 2018-04-26 DIAGNOSIS — Z00.00 ROUTINE GENERAL MEDICAL EXAMINATION AT A HEALTH CARE FACILITY: Primary | ICD-10-CM

## 2018-04-26 DIAGNOSIS — F32.0 CURRENT MILD EPISODE OF MAJOR DEPRESSIVE DISORDER WITHOUT PRIOR EPISODE (HCC): ICD-10-CM

## 2018-04-26 DIAGNOSIS — Z23 ENCOUNTER FOR IMMUNIZATION: ICD-10-CM

## 2018-04-26 DIAGNOSIS — E78.00 PURE HYPERCHOLESTEROLEMIA: ICD-10-CM

## 2018-04-26 DIAGNOSIS — N18.1 CONTROLLED TYPE 2 DIABETES MELLITUS WITH STAGE 1 CHRONIC KIDNEY DISEASE, WITHOUT LONG-TERM CURRENT USE OF INSULIN (HCC): ICD-10-CM

## 2018-04-26 DIAGNOSIS — K58.0 IRRITABLE BOWEL SYNDROME WITH DIARRHEA: ICD-10-CM

## 2018-04-26 RX ORDER — AMITRIPTYLINE HYDROCHLORIDE 50 MG/1
50 TABLET, FILM COATED ORAL
Qty: 90 TAB | Refills: 4 | Status: SHIPPED | OUTPATIENT
Start: 2018-04-26 | End: 2019-01-08 | Stop reason: SDUPTHER

## 2018-04-26 RX ORDER — SIMVASTATIN 20 MG/1
20 TABLET, FILM COATED ORAL
Qty: 90 TAB | Refills: 4 | Status: SHIPPED | OUTPATIENT
Start: 2018-04-26 | End: 2019-01-08 | Stop reason: SDUPTHER

## 2018-04-26 RX ORDER — LEVOTHYROXINE AND LIOTHYRONINE 38; 9 UG/1; UG/1
90 TABLET ORAL DAILY
COMMUNITY
End: 2020-01-13 | Stop reason: ALTCHOICE

## 2018-04-26 RX ORDER — RANITIDINE 150 MG/1
150 TABLET, FILM COATED ORAL 2 TIMES DAILY
Qty: 180 TAB | Refills: 4 | Status: SHIPPED | OUTPATIENT
Start: 2018-04-26 | End: 2019-01-08 | Stop reason: SDUPTHER

## 2018-04-26 RX ORDER — SERTRALINE HYDROCHLORIDE 100 MG/1
100 TABLET, FILM COATED ORAL DAILY
Qty: 90 TAB | Refills: 4 | Status: SHIPPED | OUTPATIENT
Start: 2018-04-26 | End: 2019-01-08 | Stop reason: SDUPTHER

## 2018-04-26 RX ORDER — TRIAMCINOLONE ACETONIDE 1 MG/G
CREAM TOPICAL 2 TIMES DAILY
Qty: 453.6 G | Refills: 12 | Status: SHIPPED | OUTPATIENT
Start: 2018-04-26 | End: 2019-01-08 | Stop reason: SDUPTHER

## 2018-04-26 RX ORDER — METFORMIN HYDROCHLORIDE 500 MG/1
500 TABLET ORAL 2 TIMES DAILY WITH MEALS
Qty: 180 TAB | Refills: 4 | Status: SHIPPED | OUTPATIENT
Start: 2018-04-26 | End: 2019-01-08 | Stop reason: SDUPTHER

## 2018-04-26 NOTE — PROGRESS NOTES
Room #  4    SUBJECTIVE:    Halima Diaz is a 59 y.o. female who presents today for complete physical    1. Have you been to the ER, urgent care clinic since your last visit? Hospitalized since your last visit? NO    2. Have you seen or consulted any other health care providers outside of the [address] [place] [address] since your last visit? Include any pap smears or colon screening. NO  When :  Reason:    Health Maintenance reviewed  Yes    Health Maintenance Due   Topic Date Due    EYE EXAM RETINAL OR DILATED Q1  04/20/2016

## 2018-04-26 NOTE — PROGRESS NOTES
Nae Meneses is a 59 y.o.  female and presents for a preventive health care visit   Subjective:  Health Maintenance History  Immunizations reviewed, shingrix indicated. Mammogram :utd nl , dexascan:utd nl ,pap smear : na,   colonoscopy:utd , Eye exam: needs scan,  Chest CT: na ,    HPI ;    Patient Active Problem List    Diagnosis Date Noted    Type 2 diabetes with nephropathy ([facility] 04/26/2018    Hep C w/o coma, chronic ([facility] 03/23/2017    Pure hypercholesterolemia 02/16/2015    Diabetes mellitus type 2, controlled ([facility] 02/16/2015    IBS (irritable bowel syndrome) 02/16/2015    Depression 02/16/2015     Current Outpatient Prescriptions   Medication Sig Dispense Refill    thyroid, Pork, (ARMOUR) 60 mg tablet Take 60 mg by mouth daily.  triamcinolone acetonide (KENALOG) 0.1 % topical cream Apply  to affected area two (2) times a day. use thin layer 453.6 g 12    metFORMIN (GLUCOPHAGE) 500 mg tablet Take 1 Tab by mouth two (2) times daily (with meals). 180 Tab 4    amitriptyline (ELAVIL) 50 mg tablet Take 1 Tab by mouth nightly. 90 Tab 4    sertraline (ZOLOFT) 100 mg tablet Take 1 Tab by mouth daily. 90 Tab 4    simvastatin (ZOCOR) 20 mg tablet Take 1 Tab by mouth nightly. 90 Tab 4    raNITIdine (ZANTAC) 150 mg tablet Take 1 Tab by mouth two (2) times a day. 180 Tab 4    progesterone (PROMETRIUM) 200 mg capsule Take 200 mg by mouth daily.  OTHER,NON-FORMULARY, Indications: testosterone and estradio pellets subQ       Allergies   Allergen Reactions    Aspirin Other (comments)     Bleeding       Past Medical History:   Diagnosis Date    Anxiety and depression     Arthritis     Depression     Depression 2/16/2015    Endometriosis     Pure hypercholesterolemia 2/16/2015    Sinus congestion      Past Surgical History:   Procedure Laterality Date    HX CHOLECYSTECTOMY      HX CHOLECYSTECTOMY      HX CYST INCISION AND DRAINAGE      rt 2010????      Family History   Problem Relation Age of Onset    Heart Disease Mother     Hypertension Father     Heart Disease Father     Diabetes Father     Cancer Paternal Grandfather     Breast Cancer Other      cousin and niece     Social History   Substance Use Topics    Smoking status: Former Smoker    Smokeless tobacco: Never Used    Alcohol use 1.5 oz/week     3 drink(s) per week       ROS       General: negative for - chills, fatigue, fever, weight change  Psych: negative for - anxiety, depression, irritability or mood swings  ENT: negative for - headaches, hearing change, nasal congestion, oral lesions, sneezing or sore throat  Heme/ Lymph: negative for - bleeding problems, bruising, pallor or swollen lymph nodes  Endo: negative for - hot flashes, polydipsia/polyuria or temperature intolerance  Resp: negative for - cough, shortness of breath or wheezing  CV: negative for - chest pain, edema or palpitations  GI: negative for - abdominal pain, change in bowel habits, constipation, diarrhea or nausea/vomiting  : negative for - dysuria, hematuria, incontinence, pelvic pain or vulvar/vaginal symptoms  MSK: negative for - joint pain, joint swelling or muscle pain  Neuro: negative for - confusion, headaches, seizures or weakness  Derm: negative for - dry skin, hair changes, rash or skin lesion changes      Objective:  Vitals:    04/26/18 0736 04/26/18 0740   BP: 140/86 128/79   Pulse: 74    Resp: 19    Temp: 95.7 °F (35.4 °C)    TempSrc: Oral    SpO2: 96%    Weight: 190 lb 9.6 oz (86.5 kg)    Height: 5' 8\" (1.727 m)    PainSc:   0 - No pain        alert, well appearing, and in no distress, oriented to person, place, and time and overweight  Mental status - alert, oriented to person, place, and time  Eyes - pupils equal and reactive, extraocular eye movements intact  Ears - bilateral TM's and external ear canals normal  Nose - normal and patent, no erythema, discharge or polyps  Mouth - mucous membranes moist, pharynx normal without lesions  Neck - supple, no significant adenopathy  Lymphatics - no palpable lymphadenopathy, no hepatosplenomegaly  Chest - clear to auscultation, no wheezes, rales or rhonchi, symmetric air entry  Heart - normal rate, regular rhythm, normal S1, S2, no murmurs, rubs, clicks or gallops  Abdomen - soft, nontender, nondistended, no masses or organomegaly  Breasts - breasts appear normal, no suspicious masses, no skin or nipple changes or axillary nodes  Back exam - full range of motion, no tenderness, palpable spasm or pain on motion  Neurological - alert, oriented, normal speech, no focal findings or movement disorder noted  Musculoskeletal - no joint tenderness, deformity or swelling  Extremities - peripheral pulses normal, no pedal edema, no clubbing or cyanosis  Skin - normal coloration and turgor, no rashes, no suspicious skin lesions noted        LABS  Component      Latest Ref Rng & Units 3/15/2018 3/15/2018 3/15/2018 3/15/2018           8:19 AM  8:19 AM  8:19 AM  8:19 AM   WBC      4.0 - 11.0 K/uL       RBC      3.80 - 5.20 M/uL       HGB      11.7 - 16.0 g/dL       HCT      35.1 - 48.0 %       MCV      80 - 95 fL       MCH      26 - 34 pg       MCHC      31 - 36 g/dL       RDW      10.0 - 15.5 %       PLATELET      784 - 540 K/uL       MPV      9.0 - 13.0 fL       NEUTROPHILS      40 - 75 %       Lymphocytes      20 - 45 %       MONOCYTES      3 - 12 %       EOSINOPHILS      0 - 6 %       BASOPHILS      0 - 2 %       ABS. NEUTROPHILS      1.8 - 7.7 K/uL       ABSOLUTE LYMPHOCYTE COUNT      1.0 - 4.8 K/uL       ABS. MONOCYTES      0.1 - 1.0 K/uL       ABS. EOSINOPHILS      0.0 - 0.5 K/uL       ABS.  BASOPHILS      0.0 - 0.2 K/uL       Glucose      70 - 99 mg/dL    235 (H)   BUN      6 - 22 mg/dL    9   Creatinine      0.5 - 1.2 mg/dL    0.9   Sodium      133 - 145 mmol/L    140   Potassium      3.5 - 5.5 mmol/L    4.3   Chloride      98 - 110 mmol/L    100   CO2      20 - 32 mmol/L    21   AST      10 - 37 U/L    17   ALT (SGPT)      5 - 40 U/L    14   Alk. phosphatase      25 - 115 U/L    72   Bilirubin, total      0.2 - 1.2 mg/dL    0.6   Calcium      8.4 - 10.5 mg/dL    9.3   Protein, total      6.4 - 8.3 g/dL    6.9   Albumin      3.5 - 5.0 g/dL    4.5   A-G Ratio      1.1 - 2.6 ratio    1.9   Globulin      2.0 - 4.0 g/dL    2.4   Anion gap      mmol/L    19.0   GFRAA      >60.0    >60.0   GFRNA      >60.0    >60.0   Specific Gravity      1.005 - 1.03       pH (UA)      5.0 - 8.0 pH       Protein      Negative, mg/dL       Glucose      Negative mg/dL       Ketone      Negative mg/dL       Bilirubin      Negative       Blood      Negative       Nitrites      Negative       Leukocyte Esterase      Negative       Urobilinogen      <2.0 mg/dL       WBC      0 - 2 /hpf       RBC      Negative, /hpf       Bacteria      Negative       Epithelial cells      0 - 2 /hpf       CA Oxalate crystals      (none)       Triglyceride      40 - 149 mg/dL   167 (H)    HDL Cholesterol      40 - 59 mg/dL   36 (L)    Cholesterol, total      110 - 200 mg/dL   143    CHOLESTEROL/HDL      0.0 - 5.0   4.0    LDL, calculated      50 - 99 mg/dL   74    VLDL, calculated      8 - 30 mg/dL   33 (H)    Creatinine, urine      mg/dL       Microalbumin, urine      0.1 - 17.0 ug/mL       Microalbumin/Creat.  Ratio      0.0 - 30.0 mcg/mg of Creatinine       Hemoglobin A1c, (calculated)      4.8 - 5.9 % 6.0 (H)      AVG GLU      91 - 123 mg/dL 126 (H)      TSH      0.27 - 4.20 mcU/mL  2.10       Component      Latest Ref Rng & Units 3/15/2018 3/15/2018 3/15/2018           8:19 AM  8:19 AM  8:19 AM   WBC      4.0 - 11.0 K/uL 12.7 (H)     RBC      3.80 - 5.20 M/uL 4.51     HGB      11.7 - 16.0 g/dL 14.6     HCT      35.1 - 48.0 % 45.1     MCV      80 - 95 fL 100 (H)     MCH      26 - 34 pg 32     MCHC      31 - 36 g/dL 32     RDW      10.0 - 15.5 % 13.6     PLATELET      886 - 779 K/uL 226     MPV      9.0 - 13.0 fL 12.5     NEUTROPHILS      40 - 75 % 72 Lymphocytes      20 - 45 % 20     MONOCYTES      3 - 12 % 6     EOSINOPHILS      0 - 6 % 2     BASOPHILS      0 - 2 % 0     ABS. NEUTROPHILS      1.8 - 7.7 K/uL 9.1 (H)     ABSOLUTE LYMPHOCYTE COUNT      1.0 - 4.8 K/uL 2.5     ABS. MONOCYTES      0.1 - 1.0 K/uL 0.8     ABS. EOSINOPHILS      0.0 - 0.5 K/uL 0.2     ABS. BASOPHILS      0.0 - 0.2 K/uL 0.0     Glucose      70 - 99 mg/dL      BUN      6 - 22 mg/dL      Creatinine      0.5 - 1.2 mg/dL      Sodium      133 - 145 mmol/L      Potassium      3.5 - 5.5 mmol/L      Chloride      98 - 110 mmol/L      CO2      20 - 32 mmol/L      AST      10 - 37 U/L      ALT (SGPT)      5 - 40 U/L      Alk. phosphatase      25 - 115 U/L      Bilirubin, total      0.2 - 1.2 mg/dL      Calcium      8.4 - 10.5 mg/dL      Protein, total      6.4 - 8.3 g/dL      Albumin      3.5 - 5.0 g/dL      A-G Ratio      1.1 - 2.6 ratio      Globulin      2.0 - 4.0 g/dL      Anion gap      mmol/L      GFRAA      >60.0      GFRNA      >60.0      Specific Gravity      1.005 - 1.03   1.016   pH (UA)      5.0 - 8.0 pH   5.0   Protein      Negative, mg/dL   Negative   Glucose      Negative mg/dL   50* (A)   Ketone      Negative mg/dL   Negative   Bilirubin      Negative   Negative   Blood      Negative   Negative   Nitrites      Negative   Negative   Leukocyte Esterase      Negative   Large (A)   Urobilinogen      <2.0 mg/dL   <2.0   WBC      0 - 2 /hpf    (A)   RBC      Negative, /hpf   0-2   Bacteria      Negative   Present (A)   Epithelial cells      0 - 2 /hpf   0-2   CA Oxalate crystals      (none)   Present (A)   Triglyceride      40 - 149 mg/dL      HDL Cholesterol      40 - 59 mg/dL      Cholesterol, total      110 - 200 mg/dL      CHOLESTEROL/HDL      0.0 - 5.0      LDL, calculated      50 - 99 mg/dL      VLDL, calculated      8 - 30 mg/dL      Creatinine, urine      mg/dL  120    Microalbumin, urine      0.1 - 17.0 ug/mL  39.8 (H)    Microalbumin/Creat.  Ratio      0.0 - 30.0 mcg/mg of Creatinine  33.2 (H)    Hemoglobin A1c, (calculated)      4.8 - 5.9 %      AVG GLU      91 - 123 mg/dL      TSH      0.27 - 4.20 mcU/mL        TESTS    ekg  nsr  l bbb     Assessment/Plan:    Health Maintenance up to date. Recommend f/u physical 1 year. Routine screening labs/tests recommended prior to next physical.              I have discussed the diagnosis with the patient and the intended plan as seen in the above orders. The patient has received an after-visit summary and questions were answered concerning future plans. I have discussed medication side effects and warnings with the patient as well. I have reviewed the plan of care with the patient, accepted their input and they are in agreement with the treatment goals. Follow-up Disposition:  Return in about 6 months (around 10/26/2018) for [facility] next visit.        -------------------------------------------------------------------------------------------------------------------    Problem Assessment  and also with   Chief Complaint   Patient presents with    Diabetes    Depression    Irritable Bowel Syndrome    Cholesterol Problem         HPI ;  Cardiovascular Review:  The patient has diabetes and hyperlipidemia. Diet and Lifestyle: not attempting to follow a low fat, low cholesterol diet, not attempting to follow a low sodium diet  Home BP Monitoring: is not measured at home. Pertinent ROS: taking medications as instructed, no medication side effects noted, no TIA's, no chest pain on exertion, no dyspnea on exertion, no swelling of ankles. Diabetes Mellitus:  She has diabetes mellitus, and  diabetes and hyperlipidemia. Diabetic ROS - diabetic diet compliance: compliant all of the time. Lab review: labs are reviewed, up to date and normal.   Depression Review:  Patient is seen for followup of depression. Treatment includes Zoloft and no other therapies. Ongoing symptoms include depressed mood. She denies weight loss.    She experiences the following side effects from the treatment: none. ibs stable on meds   Additional Concerns:               Assessment/Plan:      Diabetes - well controlled, stable  Hyperlipidemia - well controlled, stable  Depression stable  ibs stable      Diagnoses and all orders for this visit:    1. Routine general medical examination at a health care facility  -     AMB POC EKG ROUTINE W/ 12 LEADS, INTER & REP    2. Pure hypercholesterolemia  -     metFORMIN (GLUCOPHAGE) 500 mg tablet; Take 1 Tab by mouth two (2) times daily (with meals). -     amitriptyline (ELAVIL) 50 mg tablet; Take 1 Tab by mouth nightly. -     sertraline (ZOLOFT) 100 mg tablet; Take 1 Tab by mouth daily. -     simvastatin (ZOCOR) 20 mg tablet; Take 1 Tab by mouth nightly. -     raNITIdine (ZANTAC) 150 mg tablet; Take 1 Tab by mouth two (2) times a day. 3. Irritable bowel syndrome with diarrhea    4. Current mild episode of major depressive disorder without prior episode ([facility]    5. Irritable bowel syndrome without diarrhea  -     metFORMIN (GLUCOPHAGE) 500 mg tablet; Take 1 Tab by mouth two (2) times daily (with meals). -     amitriptyline (ELAVIL) 50 mg tablet; Take 1 Tab by mouth nightly. -     sertraline (ZOLOFT) 100 mg tablet; Take 1 Tab by mouth daily. -     simvastatin (ZOCOR) 20 mg tablet; Take 1 Tab by mouth nightly. -     raNITIdine (ZANTAC) 150 mg tablet; Take 1 Tab by mouth two (2) times a day. 6. Hep C w/o coma, chronic (HCC)  -     metFORMIN (GLUCOPHAGE) 500 mg tablet; Take 1 Tab by mouth two (2) times daily (with meals). -     amitriptyline (ELAVIL) 50 mg tablet; Take 1 Tab by mouth nightly. -     sertraline (ZOLOFT) 100 mg tablet; Take 1 Tab by mouth daily. -     simvastatin (ZOCOR) 20 mg tablet; Take 1 Tab by mouth nightly. -     raNITIdine (ZANTAC) 150 mg tablet; Take 1 Tab by mouth two (2) times a day. 7. Type 2 diabetes with nephropathy ([facility]    8.  Controlled type 2 diabetes mellitus with stage 1 chronic kidney disease, without long-term current use of insulin ([facility]    9. Encounter for immunization  -     Zoster (SHINGLES) vaccine, live, subcut, INJ    Other orders  -     triamcinolone acetonide (KENALOG) 0.1 % topical cream; Apply  to affected area two (2) times a day.  use thin layer          Lab review: labs are reviewed, up to date and normal

## 2018-04-26 NOTE — MR AVS SNAPSHOT
[address] [place] [id number] [phone] Patient: Jasson Orta MRN: [id number] Visit Information Date & Time Provider Department Dept. Phone Encounter #  
 4/26/2018  8:00 AM Adela Whitlock, [address] [place] [address] [phone] [id number] Follow-up Instructions Return in about 6 months (around 10/26/2018) for pascual, [facility] next visit, retinal scan. Upcoming Health Maintenance Date Due  
 EYE EXAM RETINAL OR DILATED Q1 4/20/2016 HEMOGLOBIN A1C Q6M 9/15/2018 FOOT EXAM Q1 9/25/2018 MICROALBUMIN Q1 3/15/2019 LIPID PANEL Q1 3/15/2019 BREAST CANCER SCRN MAMMOGRAM 3/9/2020 PAP AKA CERVICAL CYTOLOGY 4/25/2021 DTaP/Tdap/Td series (2 - Td) 3/17/2025 COLONOSCOPY 3/17/2025 Allergies as of 4/26/2018  Review Complete On: 4/26/2018 By: Adela Whitlock, DO Severity Noted Reaction Type Reactions Aspirin  02/16/2015    Other (comments) Bleeding Current Immunizations  Reviewed on 3/15/2016 Name Date Influenza Vaccine 3/15/2016 Influenza Vaccine (Quad) PF 9/25/2017 Pneumococcal Polysaccharide (PPSV-23) 3/17/2015 Tdap 3/17/2015 Zoster Vaccine, Live  Incomplete Not reviewed this visit You Were Diagnosed With   
  
 Codes Comments Routine general medical examination at a health care facility    -  Primary ICD-10-CM: Z00.00 ICD-9-CM: V70.0 Pure hypercholesterolemia     ICD-10-CM: E78.00 ICD-9-CM: 272.0 Irritable bowel syndrome with diarrhea     ICD-10-CM: K58.0 ICD-9-CM: 785.8 Current mild episode of major depressive disorder without prior episode ([facility]     ICD-10-CM: F32.0 ICD-9-CM: 296.21 Irritable bowel syndrome without diarrhea     ICD-10-CM: K58.9 ICD-9-CM: 701.6 Hep C w/o coma, chronic (HCC)     ICD-10-CM: B18.2 ICD-9-CM: 070.54  Type 2 diabetes with nephropathy (HCC)     ICD-10-CM: E11.21 
 ICD-9-CM: 250.40, 583.81 Controlled type 2 diabetes mellitus with stage 1 chronic kidney disease, without long-term current use of insulin (HCC)     ICD-10-CM: E11.22, N18.1 ICD-9-CM: 250.40, 585.1 Encounter for immunization     ICD-10-CM: W11 ICD-9-CM: V03.89 Vitals BP Pulse Temp Resp Height(growth percentile) Weight(growth percentile) 128/79 (BP 1 Location: Left arm, BP Patient Position: Sitting) 74 95.7 °F (35.4 °C) (Oral) 19 5' 8\" (1.727 m) 190 lb 9.6 oz (86.5 kg) SpO2 BMI OB Status Smoking Status 96% 28.98 kg/m2 Menopause Former Smoker Vitals History BMI and BSA Data Body Mass Index Body Surface Area  
 28.98 kg/m 2 2.04 m 2 Preferred Pharmacy Pharmacy Name Phone [facility] [address] [place] [address] Severiano Morgan Your Updated Medication List  
  
   
This list is accurate as of 4/26/18  8:11 AM.  Always use your most recent med list.  
  
  
  
  
 amitriptyline 50 mg tablet Commonly known as:  ELAVIL Take 1 Tab by mouth nightly. metFORMIN 500 mg tablet Commonly known as:  GLUCOPHAGE Take 1 Tab by mouth two (2) times daily (with meals). OTHER(NON-FORMULARY) Indications: testosterone and estradio pellets subQ  
  
 progesterone 200 mg capsule Commonly known as:  PROMETRIUM Take 200 mg by mouth daily. raNITIdine 150 mg tablet Commonly known as:  ZANTAC Take 1 Tab by mouth two (2) times a day. sertraline 100 mg tablet Commonly known as:  ZOLOFT Take 1 Tab by mouth daily. simvastatin 20 mg tablet Commonly known as:  ZOCOR Take 1 Tab by mouth nightly. thyroid (Pork) 60 mg tablet Commonly known as:  ARMOUR Take 60 mg by mouth daily. triamcinolone acetonide 0.1 % topical cream  
Commonly known as:  KENALOG Apply  to affected area two (2) times a day. use thin layer Prescriptions Sent to Pharmacy Refills  
 triamcinolone acetonide (KENALOG) 0.1 % topical cream 12 Sig: Apply  to affected area two (2) times a day. use thin layer Class: Normal  
 Pharmacy: [facility] [address] Ph #: [phone] Route: Topical  
 metFORMIN (GLUCOPHAGE) 500 mg tablet 4 Sig: Take 1 Tab by mouth two (2) times daily (with meals). Class: Normal  
 Pharmacy: [facility] [address] Ph #: [phone] Route: Oral  
 amitriptyline (ELAVIL) 50 mg tablet 4 Sig: Take 1 Tab by mouth nightly. Class: Normal  
 Pharmacy: [facility] [address] Ph #: [phone] Route: Oral  
 sertraline (ZOLOFT) 100 mg tablet 4 Sig: Take 1 Tab by mouth daily. Class: Normal  
 Pharmacy: [facility] [address] Ph #: [phone] Route: Oral  
 simvastatin (ZOCOR) 20 mg tablet 4 Sig: Take 1 Tab by mouth nightly. Class: Normal  
 Pharmacy: [facility] [address] Ph #: [phone] Route: Oral  
 raNITIdine (ZANTAC) 150 mg tablet 4 Sig: Take 1 Tab by mouth two (2) times a day. Class: Normal  
 Pharmacy: [facility] [address] Ph #: [phone] Route: Oral  
  
We Performed the Following AMB POC EKG ROUTINE W/ 12 LEADS, INTER & REP [29329 CPT(R)] ZOSTER (SHINGLES) VACCINE, LIVE, SC INJECTION [id number] CPT(R)] Follow-up Instructions Return in about 6 months (around 10/26/2018) for rov, [facility] next visit, retinal scan. Introducing [facility]! Dear Pedro Stacy: Thank you for requesting a [employer] account.   Our records indicate that you already have an active [employer] account. You can access your account anytime at [email or web address] Did you know that you can access your hospital and ER discharge instructions at any time in [employer]? You can also review all of your test results from your hospital stay or ER visit. Additional Information If you have questions, please visit the Frequently Asked Questions section of the [employer] website at [email or web address] Remember, [employer] is NOT to be used for urgent needs. For medical emergencies, dial 911. Now available from your iPhone and Android! Please provide this summary of care documentation to your next provider. Your primary care clinician is listed as [address]. If you have any questions after today's visit, please call [phone].

## 2018-10-24 ENCOUNTER — OFFICE VISIT (OUTPATIENT)
Dept: FAMILY MEDICINE CLINIC | Age: 58
End: 2018-10-24

## 2018-10-24 VITALS
WEIGHT: 200.8 LBS | TEMPERATURE: 97.7 F | HEART RATE: 73 BPM | BODY MASS INDEX: 30.43 KG/M2 | RESPIRATION RATE: 20 BRPM | DIASTOLIC BLOOD PRESSURE: 68 MMHG | SYSTOLIC BLOOD PRESSURE: 122 MMHG | OXYGEN SATURATION: 97 % | HEIGHT: 68 IN

## 2018-10-24 DIAGNOSIS — F32.0 CURRENT MILD EPISODE OF MAJOR DEPRESSIVE DISORDER WITHOUT PRIOR EPISODE (HCC): ICD-10-CM

## 2018-10-24 DIAGNOSIS — E78.00 PURE HYPERCHOLESTEROLEMIA: ICD-10-CM

## 2018-10-24 DIAGNOSIS — E11.22 CONTROLLED TYPE 2 DIABETES MELLITUS WITH STAGE 1 CHRONIC KIDNEY DISEASE, WITHOUT LONG-TERM CURRENT USE OF INSULIN (HCC): ICD-10-CM

## 2018-10-24 DIAGNOSIS — Z23 ENCOUNTER FOR IMMUNIZATION: Primary | ICD-10-CM

## 2018-10-24 DIAGNOSIS — N18.1 CONTROLLED TYPE 2 DIABETES MELLITUS WITH STAGE 1 CHRONIC KIDNEY DISEASE, WITHOUT LONG-TERM CURRENT USE OF INSULIN (HCC): ICD-10-CM

## 2018-10-24 PROBLEM — Z86.19 HISTORY OF HEPATITIS C: Status: ACTIVE | Noted: 2018-10-24

## 2018-10-24 PROBLEM — B18.2 HEP C W/O COMA, CHRONIC (HCC): Status: RESOLVED | Noted: 2017-03-23 | Resolved: 2018-10-24

## 2018-10-24 LAB
HBA1C MFR BLD HPLC: 7 %
LEFT EYE DIABETIC RETINOPATHY: ABNORMAL
LEFT EYE IMAGE QUALITY: ABNORMAL
LEFT EYE MACULAR EDEMA: ABNORMAL
LEFT EYE OTHER RETINOPATHY: ABNORMAL
RESULT: ABNORMAL
RIGHT EYE DIABETIC RETINOPATHY: ABNORMAL
RIGHT EYE IMAGE QUALITY: ABNORMAL
RIGHT EYE MACULAR EDEMA: ABNORMAL
RIGHT EYE OTHER RETINOPATHY: ABNORMAL
SEVERITY: ABNORMAL

## 2018-10-24 NOTE — PROGRESS NOTES
Luz Elena Henson is a 59 y.o.  female and presents with Chief Complaint Patient presents with  Diabetes  Cholesterol Problem  Irritable Bowel Syndrome  Depression  Menopause  Thyroid Problem Subjective: 
Pt is going to [facility] getting estrogen, testosterone, thyroid, and progesterone replacment therapy. No notes or labs available. Cardiovascular Review: 
The patient has diabetes and hyperlipidemia. Diet and Lifestyle: generally follows a low sodium diet Home BP Monitoring: is not measured at home. Pertinent ROS: taking medications as instructed, no medication side effects noted, no TIA's, no chest pain on exertion, no dyspnea on exertion, no swelling of ankles. Diabetes Mellitus: 
She has diabetes mellitus, and  diabetes, hypertension and hyperlipidemia. Diabetic ROS - diabetic diet compliance: compliant most of the time. Lab review: labs are reviewed, up to date and normal.  
Depression Review: 
Patient is seen for followup of depression. Treatment includes SSRI and no other therapies. Ongoing symptoms include depressed mood. She denies weight gain. She experiences the following side effects from the treatment: none. Additional Concerns:   
 
 
 
Patient Active Problem List  
 Diagnosis Date Noted  History of hepatitis C 10/24/2018  Type 2 diabetes with nephropathy ([facility] [address] 04/26/2018  Pure hypercholesterolemia 02/16/2015  Diabetes mellitus type 2, controlled ([facility] [address] 02/16/2015  IBS (irritable bowel syndrome) 02/16/2015  Depression 02/16/2015 Current Outpatient Medications Medication Sig Dispense Refill  thyroid, Pork, (ARMOUR) 60 mg tablet Take 90 mg by mouth daily.  triamcinolone acetonide (KENALOG) 0.1 % topical cream Apply  to affected area two (2) times a day. use thin layer 453.6 g 12  
 metFORMIN (GLUCOPHAGE) 500 mg tablet Take 1 Tab by mouth two (2) times daily (with meals). 180 Tab 4  amitriptyline (ELAVIL) 50 mg tablet Take 1 Tab by mouth nightly. 90 Tab 4  
 sertraline (ZOLOFT) 100 mg tablet Take 1 Tab by mouth daily. 90 Tab 4  
 simvastatin (ZOCOR) 20 mg tablet Take 1 Tab by mouth nightly. 90 Tab 4  
 raNITIdine (ZANTAC) 150 mg tablet Take 1 Tab by mouth two (2) times a day. 180 Tab 4  progesterone (PROMETRIUM) 200 mg capsule Take 450 mg by mouth daily.  OTHER,NON-FORMULARY, Indications: testosterone and estradio pellets subQ Allergies Allergen Reactions  Aspirin Other (comments) Bleeding Past Medical History:  
Diagnosis Date  Anxiety and depression  Arthritis  Depression  Depression 2/16/2015  Endometriosis  History of hepatitis C 10/24/2018  Pure hypercholesterolemia 2/16/2015  Sinus congestion Past Surgical History:  
Procedure Laterality Date  HX CHOLECYSTECTOMY  HX CHOLECYSTECTOMY  HX CYST INCISION AND DRAINAGE    
 rt 2010???? Family History Problem Relation Age of Onset  Heart Disease Mother  Hypertension Father  Heart Disease Father  Diabetes Father  Cancer Paternal Grandfather  Breast Cancer Other   
     cousin and niece Social History Tobacco Use  Smoking status: Former Smoker  Smokeless tobacco: Never Used Substance Use Topics  Alcohol use: Yes Alcohol/week: 1.5 oz Types: 3 drink(s) per week ROS All other systems reviewed and are negative. Objective: 
Vitals:  
 10/24/18 2896 BP: 122/68 Pulse: 73 Resp: 20 Temp: 97.7 °F (36.5 °C) TempSrc: Oral  
SpO2: 97% Weight: 200 lb 12.8 oz (91.1 kg) Height: 5' 8\" (1.727 m) PainSc:   0 - No pain  
 
 
 
 
 
 
 
alert, well appearing, and in no distress, oriented to person, place, and time and overweight Chest - clear to auscultation, no wheezes, rales or rhonchi, symmetric air entry Heart - normal rate, regular rhythm, normal S1, S2, no murmurs, rubs, clicks or gallops Abdomen - soft, nontender, nondistended, no masses or organomegaly LABS  
aic  7.0 TESTS Assessment/Plan:   
Diabetes - needs improvement, needs to follow diet more regularly. Consider recheck in 2 mo Hyperlipidemia - stable Depression fairly stable 
ibs ongoing Notes np julio has been adjusting thyroid , hormones etc.  
 
Lab review: labs are reviewed, up to date and normal 
 
Diagnoses and all orders for this visit: 1. Encounter for immunization 
-     INFLUENZA VIRUS VAC QUAD,SPLIT,PRESV FREE SYRINGE IM 
-     SC IMMUNIZ ADMIN,1 SINGLE/COMB VAC/TOXOID 
-     AMB POC HEMOGLOBIN A1C 
-     TSH 3RD GENERATION; Future -     T4, FREE; Future 
-     HEMOGLOBIN A1C WITH EAG; Future -     METABOLIC PANEL, COMPREHENSIVE; Future -     LIPID PANEL; Future 2. Controlled type 2 diabetes mellitus with stage 1 chronic kidney disease, without long-term current use of insulin (HCC) 
-     TSH 3RD GENERATION; Future -     T4, FREE; Future 
-     HEMOGLOBIN A1C WITH EAG; Future -     METABOLIC PANEL, COMPREHENSIVE; Future -     LIPID PANEL; Future 3. Pure hypercholesterolemia 
-     TSH 3RD GENERATION; Future -     T4, FREE; Future 
-     HEMOGLOBIN A1C WITH EAG; Future -     METABOLIC PANEL, COMPREHENSIVE; Future -     LIPID PANEL; Future 4. Current mild episode of major depressive disorder without prior episode ([facility] [address] 
-     TSH 3RD GENERATION; Future -     T4, FREE; Future 
-     HEMOGLOBIN A1C WITH EAG; Future -     METABOLIC PANEL, COMPREHENSIVE; Future -     LIPID PANEL; Future I have discussed the diagnosis with the patient and the intended plan as seen in the above orders. The patient has received an after-visit summary and questions were answered concerning future plans. I have discussed medication side effects and warnings with the patient as well.  I have reviewed the plan of care with the patient, accepted their input and they are in agreement with the treatment goals. Follow-up Disposition: 
Return in about 2 months (around 12/24/2018) for EOV, labs prior.

## 2018-10-24 NOTE — PROGRESS NOTES
Room #  4 SUBJECTIVE: 
 
Joseph Gould is a 59 y.o. female who presents today for follow up for diabetes 1. Have you been to the ER, urgent care clinic since your last visit? Hospitalized since your last visit? NO 
 
2. Have you seen or consulted any other health care providers outside of the [facility] since your last visit? Include any pap smears or colon screening. NO When : 
Reason: 
 
Health Maintenance reviewed Yes Health Maintenance Due Topic Date Due  Shingrix Vaccine Age 50> (1 of 2) 09/02/2008  
 EYE EXAM RETINAL OR DILATED Q1  04/20/2016  Influenza Age 5 to Adult  08/01/2018  HEMOGLOBIN A1C Q6M  09/15/2018  
 FOOT EXAM Q1  09/25/2018 Joseph Gould is a 59 y.o. female who presents for routine immunizations. She denies any symptoms , reactions or allergies that would exclude them from being immunized today. Risks and adverse reactions were discussed and the VIS was given to them. All questions were addressed. She was observed for 15 min post injection. There were no reactions observed.  
 
Saurabh Russ LPN

## 2018-11-23 DIAGNOSIS — Z23 ENCOUNTER FOR IMMUNIZATION: ICD-10-CM

## 2018-11-23 DIAGNOSIS — F32.0 CURRENT MILD EPISODE OF MAJOR DEPRESSIVE DISORDER WITHOUT PRIOR EPISODE (HCC): ICD-10-CM

## 2018-11-23 DIAGNOSIS — N18.1 CONTROLLED TYPE 2 DIABETES MELLITUS WITH STAGE 1 CHRONIC KIDNEY DISEASE, WITHOUT LONG-TERM CURRENT USE OF INSULIN (HCC): ICD-10-CM

## 2018-11-23 DIAGNOSIS — E11.22 CONTROLLED TYPE 2 DIABETES MELLITUS WITH STAGE 1 CHRONIC KIDNEY DISEASE, WITHOUT LONG-TERM CURRENT USE OF INSULIN (HCC): ICD-10-CM

## 2018-11-23 DIAGNOSIS — E78.00 PURE HYPERCHOLESTEROLEMIA: ICD-10-CM

## 2018-12-29 LAB
A-G RATIO,AGRAT: 2.3 RATIO (ref 1.1–2.6)
ALBUMIN SERPL-MCNC: 4.4 G/DL (ref 3.5–5)
ALP SERPL-CCNC: 71 U/L (ref 40–120)
ALT SERPL-CCNC: 16 U/L (ref 5–40)
ANION GAP SERPL CALC-SCNC: 14 MMOL/L
AST SERPL W P-5'-P-CCNC: 18 U/L (ref 10–37)
AVG GLU, 10930: 126 MG/DL (ref 91–123)
BILIRUB SERPL-MCNC: 0.6 MG/DL (ref 0.2–1.2)
BUN SERPL-MCNC: 14 MG/DL (ref 6–22)
CALCIUM SERPL-MCNC: 9.3 MG/DL (ref 8.4–10.5)
CHLORIDE SERPL-SCNC: 103 MMOL/L (ref 98–110)
CHOLEST SERPL-MCNC: 125 MG/DL (ref 110–200)
CO2 SERPL-SCNC: 23 MMOL/L (ref 20–32)
CREAT SERPL-MCNC: 0.8 MG/DL (ref 0.8–1.4)
GFRAA, 66117: >60
GFRNA, 66118: >60
GLOBULIN,GLOB: 1.9 G/DL (ref 2–4)
GLUCOSE SERPL-MCNC: 117 MG/DL (ref 70–99)
HBA1C MFR BLD HPLC: 6 % (ref 4.8–5.9)
HDLC SERPL-MCNC: 31 MG/DL (ref 40–59)
HDLC SERPL-MCNC: 4 MG/DL (ref 0–5)
LDLC SERPL CALC-MCNC: 69 MG/DL (ref 50–99)
POTASSIUM SERPL-SCNC: 5 MMOL/L (ref 3.5–5.5)
PROT SERPL-MCNC: 6.3 G/DL (ref 6.2–8.1)
SODIUM SERPL-SCNC: 140 MMOL/L (ref 133–145)
T4 FREE SERPL-MCNC: 1 NG/DL (ref 0.9–1.8)
TRIGL SERPL-MCNC: 124 MG/DL (ref 40–149)
TSH SERPL DL<=0.005 MIU/L-ACNC: 0.01 MCU/ML (ref 0.27–4.2)
VLDLC SERPL CALC-MCNC: 25 MG/DL (ref 8–30)

## 2019-01-08 ENCOUNTER — OFFICE VISIT (OUTPATIENT)
Dept: FAMILY MEDICINE CLINIC | Age: 59
End: 2019-01-08

## 2019-01-08 VITALS
TEMPERATURE: 97.8 F | HEART RATE: 73 BPM | WEIGHT: 180.8 LBS | RESPIRATION RATE: 19 BRPM | BODY MASS INDEX: 27.4 KG/M2 | OXYGEN SATURATION: 96 % | DIASTOLIC BLOOD PRESSURE: 63 MMHG | HEIGHT: 68 IN | SYSTOLIC BLOOD PRESSURE: 101 MMHG

## 2019-01-08 DIAGNOSIS — E11.21 TYPE 2 DIABETES WITH NEPHROPATHY (HCC): ICD-10-CM

## 2019-01-08 DIAGNOSIS — K58.9 IRRITABLE BOWEL SYNDROME WITHOUT DIARRHEA: ICD-10-CM

## 2019-01-08 DIAGNOSIS — E78.00 PURE HYPERCHOLESTEROLEMIA: ICD-10-CM

## 2019-01-08 DIAGNOSIS — E11.22 CONTROLLED TYPE 2 DIABETES MELLITUS WITH STAGE 1 CHRONIC KIDNEY DISEASE, WITHOUT LONG-TERM CURRENT USE OF INSULIN (HCC): Primary | ICD-10-CM

## 2019-01-08 DIAGNOSIS — B18.2 HEP C W/O COMA, CHRONIC (HCC): ICD-10-CM

## 2019-01-08 DIAGNOSIS — N18.1 CONTROLLED TYPE 2 DIABETES MELLITUS WITH STAGE 1 CHRONIC KIDNEY DISEASE, WITHOUT LONG-TERM CURRENT USE OF INSULIN (HCC): ICD-10-CM

## 2019-01-08 DIAGNOSIS — E11.22 CONTROLLED TYPE 2 DIABETES MELLITUS WITH STAGE 1 CHRONIC KIDNEY DISEASE, WITHOUT LONG-TERM CURRENT USE OF INSULIN (HCC): ICD-10-CM

## 2019-01-08 DIAGNOSIS — N18.1 CONTROLLED TYPE 2 DIABETES MELLITUS WITH STAGE 1 CHRONIC KIDNEY DISEASE, WITHOUT LONG-TERM CURRENT USE OF INSULIN (HCC): Primary | ICD-10-CM

## 2019-01-08 DIAGNOSIS — F32.0 CURRENT MILD EPISODE OF MAJOR DEPRESSIVE DISORDER WITHOUT PRIOR EPISODE (HCC): ICD-10-CM

## 2019-01-08 RX ORDER — SERTRALINE HYDROCHLORIDE 100 MG/1
100 TABLET, FILM COATED ORAL DAILY
Qty: 90 TAB | Refills: 4 | Status: SHIPPED | OUTPATIENT
Start: 2019-01-08 | End: 2020-01-13 | Stop reason: SDUPTHER

## 2019-01-08 RX ORDER — RANITIDINE 150 MG/1
150 TABLET, FILM COATED ORAL 2 TIMES DAILY
Qty: 180 TAB | Refills: 4 | Status: SHIPPED | OUTPATIENT
Start: 2019-01-08 | End: 2021-11-04

## 2019-01-08 RX ORDER — METFORMIN HYDROCHLORIDE 500 MG/1
500 TABLET ORAL 2 TIMES DAILY WITH MEALS
Qty: 180 TAB | Refills: 4 | Status: SHIPPED | OUTPATIENT
Start: 2019-01-08 | End: 2019-01-08

## 2019-01-08 RX ORDER — TRIAMCINOLONE ACETONIDE 1 MG/G
CREAM TOPICAL 2 TIMES DAILY
Qty: 453.6 G | Refills: 12 | Status: SHIPPED | OUTPATIENT
Start: 2019-01-08 | End: 2020-01-13 | Stop reason: SDUPTHER

## 2019-01-08 RX ORDER — AMITRIPTYLINE HYDROCHLORIDE 50 MG/1
50 TABLET, FILM COATED ORAL
Qty: 90 TAB | Refills: 4 | Status: SHIPPED | OUTPATIENT
Start: 2019-01-08 | End: 2020-01-13 | Stop reason: SDUPTHER

## 2019-01-08 RX ORDER — SIMVASTATIN 20 MG/1
20 TABLET, FILM COATED ORAL
Qty: 90 TAB | Refills: 4 | Status: SHIPPED | OUTPATIENT
Start: 2019-01-08 | End: 2019-01-08

## 2019-01-08 NOTE — PROGRESS NOTES
Room # SUBJECTIVE: 
 
Margot Hodgkins is a 59 y.o. female who presents today for follow up for lab review 1. Have you been to the ER, urgent care clinic since your last visit? Hospitalized since your last visit? NO 
 
2. Have you seen or consulted any other health care providers outside of the [facility] since your last visit? Include any pap smears or colon screening. NO When : 
Reason: 
 
Health Maintenance reviewed Yes Health Maintenance Due Topic Date Due  Shingrix Vaccine Age 50> (1 of 2) 09/02/2008  FOOT EXAM Q1  09/25/2018

## 2019-01-08 NOTE — PROGRESS NOTES
Kenyatta Anand is a 59 y.o.  female and presents with Chief Complaint Patient presents with  Diabetes  Cholesterol Problem  Depression  Irritable Bowel Syndrome Subjective: 
 
Cardiovascular Review: 
The patient has diabetes and hyperlipidemia. Diet and Lifestyle: not attempting to follow a low fat, low cholesterol diet, not attempting to follow a low sodium diet Home BP Monitoring: is not measured at home. Pertinent ROS: taking medications as instructed, no medication side effects noted, no TIA's, no chest pain on exertion, no dyspnea on exertion, no swelling of ankles. Diabetes Mellitus: 
She has diabetes mellitus, and  diabetes and hyperlipidemia. Diabetic ROS - diabetic diet compliance: compliant most of the time. Lab review: labs are reviewed, up to date and normal.  
Depression Review: 
Patient is seen for followup of depression. Treatment includes Zoloft and no other therapies. Ongoing symptoms include depressed mood. She denies fatigue. She experiences the following side effects from the treatment: none. Additional Concerns:   
 
 
 
Patient Active Problem List  
 Diagnosis Date Noted  History of hepatitis C 10/24/2018  Type 2 diabetes with nephropathy ([facility] [address] 04/26/2018  Pure hypercholesterolemia 02/16/2015  Diabetes mellitus type 2, controlled ([facility] [address] 02/16/2015  IBS (irritable bowel syndrome) 02/16/2015  Depression 02/16/2015 Current Outpatient Medications Medication Sig Dispense Refill  triamcinolone acetonide (KENALOG) 0.1 % topical cream Apply  to affected area two (2) times a day. use thin layer 453.6 g 12  
 amitriptyline (ELAVIL) 50 mg tablet Take 1 Tab by mouth nightly. 90 Tab 4  
 sertraline (ZOLOFT) 100 mg tablet Take 1 Tab by mouth daily. 90 Tab 4  
 raNITIdine (ZANTAC) 150 mg tablet Take 1 Tab by mouth two (2) times a day. 180 Tab 4  thyroid, Pork, (ARMOUR) 60 mg tablet Take 90 mg by mouth daily.  progesterone (PROMETRIUM) 200 mg capsule Take 450 mg by mouth daily.  OTHER,NON-FORMULARY, Indications: testosterone and estradio pellets subQ Allergies Allergen Reactions  Aspirin Other (comments) Bleeding Past Medical History:  
Diagnosis Date  Anxiety and depression  Arthritis  Depression  Depression 2/16/2015  Endometriosis  History of hepatitis C 10/24/2018  Pure hypercholesterolemia 2/16/2015  Sinus congestion Past Surgical History:  
Procedure Laterality Date  HX CHOLECYSTECTOMY  HX CHOLECYSTECTOMY  HX CYST INCISION AND DRAINAGE    
 rt 2010???? Family History Problem Relation Age of Onset  Heart Disease Mother  Hypertension Father  Heart Disease Father  Diabetes Father  Cancer Paternal Grandfather  Breast Cancer Other   
     cousin and niece Social History Tobacco Use  Smoking status: Former Smoker  Smokeless tobacco: Never Used Substance Use Topics  Alcohol use: Yes Alcohol/week: 1.5 oz Types: 3 drink(s) per week ROS All other systems reviewed and are negative. Objective: 
Vitals:  
 01/08/19 7307 BP: 101/63 Pulse: 73 Resp: 19 Temp: 97.8 °F (36.6 °C) TempSrc: Oral  
SpO2: 96% Weight: 180 lb 12.8 oz (82 kg) Height: 5' 8\" (1.727 m) PainSc:   0 - No pain  
 
 
 
 
 
 
 
alert, well appearing, and in no distress, oriented to person, place, and time and normal appearing weight Chest - clear to auscultation, no wheezes, rales or rhonchi, symmetric air entry Heart - normal rate, regular rhythm, normal S1, S2, no murmurs, rubs, clicks or gallops Abdomen - soft, nontender, nondistended, no masses or organomegaly LABS Component Latest Ref Rng & Units 12/28/2018 12/28/2018 12/28/2018 12/28/2018 12:00 PM 12:00 PM 12:00 PM 12:00 PM  
Glucose 70 - 99 mg/dL   117 (H) BUN 
    6 - 22 mg/dL   14 Creatinine 0.8 - 1.4 mg/dL   0.8 Sodium 133 - 145 mmol/L   140 Potassium 3.5 - 5.5 mmol/L   5.0 Chloride 98 - 110 mmol/L   103 CO2 
    20 - 32 mmol/L   23 AST 
    10 - 37 U/L   18   
ALT (SGPT) 5 - 40 U/L   16 Alk. phosphatase 40 - 120 U/L   71 Bilirubin, total 
    0.2 - 1.2 mg/dL   0.6 Calcium 8.4 - 10.5 mg/dL   9.3 Protein, total 
    6.2 - 8.1 g/dL   6.3 Albumin 3.5 - 5.0 g/dL   4.4 A-G Ratio 1.1 - 2.6 ratio   2.3 Globulin 2.0 - 4.0 g/dL   1.9 (L) Anion gap 
    mmol/L   14.0   
GFRAA 
    >60.0   >60.0 GFRNA 
    >60.0   >60.0 Triglyceride 40 - 149 mg/dL    124 HDL Cholesterol 40 - 59 mg/dL    31 (L) Cholesterol, total 
    110 - 200 mg/dL    125 CHOLESTEROL/HDL 
    0.0 - 5.0    4.0 LDL, calculated 50 - 99 mg/dL    69  
VLDL, calculated 8 - 30 mg/dL    25 Hemoglobin A1c, (calculated) 4.8 - 5.9 % 6.0 (H) AVG GLU 
    91 - 123 mg/dL 126 (H) T4, Free 0.9 - 1.8 ng/dL  1.0 TSH 
    0.27 - 4.20 mcU/mL Component Latest Ref Rng & Units 12/28/2018 12:00 PM  
Glucose 70 - 99 mg/dL BUN 
    6 - 22 mg/dL Creatinine 0.8 - 1.4 mg/dL Sodium 133 - 145 mmol/L Potassium 3.5 - 5.5 mmol/L Chloride 98 - 110 mmol/L   
CO2 
    20 - 32 mmol/L   
AST 
    10 - 37 U/L   
ALT (SGPT) 5 - 40 U/L Alk. phosphatase 40 - 120 U/L Bilirubin, total 
    0.2 - 1.2 mg/dL Calcium 8.4 - 10.5 mg/dL Protein, total 
    6.2 - 8.1 g/dL Albumin 3.5 - 5.0 g/dL A-G Ratio 1.1 - 2.6 ratio Globulin 2.0 - 4.0 g/dL Anion gap 
    mmol/L   
GFRAA 
    >60.0 GFRNA 
    >60.0 Triglyceride 40 - 149 mg/dL HDL Cholesterol 40 - 59 mg/dL Cholesterol, total 
    110 - 200 mg/dL CHOLESTEROL/HDL 
    0.0 - 5.0 LDL, calculated 50 - 99 mg/dL VLDL, calculated 8 - 30 mg/dL Hemoglobin A1c, (calculated) 4.8 - 5.9 % AVG GLU 
    91 - 123 mg/dL T4, Free 0.9 - 1.8 ng/dL TSH 
    0.27 - 4.20 mcU/mL 0.01 (L) TESTS Assessment/Plan:   
Diabetes - stable try off metformin and f/u 3 m,o Hyperlipidemia - stable try off zocor f/u 3 mo Thyroid on meds from [facility] Depression stable 
ibs stable ongiong Lab review: labs are reviewed, up to date and normal 
 
Diagnoses and all orders for this visit: 1. Pure hypercholesterolemia 
-     amitriptyline (ELAVIL) 50 mg tablet; Take 1 Tab by mouth nightly. -     sertraline (ZOLOFT) 100 mg tablet; Take 1 Tab by mouth daily. -     raNITIdine (ZANTAC) 150 mg tablet; Take 1 Tab by mouth two (2) times a day. 2. Irritable bowel syndrome without diarrhea 
-     amitriptyline (ELAVIL) 50 mg tablet; Take 1 Tab by mouth nightly. -     sertraline (ZOLOFT) 100 mg tablet; Take 1 Tab by mouth daily. -     raNITIdine (ZANTAC) 150 mg tablet; Take 1 Tab by mouth two (2) times a day. 3. Hep C w/o coma, chronic (HCC) 
-     amitriptyline (ELAVIL) 50 mg tablet; Take 1 Tab by mouth nightly. -     sertraline (ZOLOFT) 100 mg tablet; Take 1 Tab by mouth daily. -     raNITIdine (ZANTAC) 150 mg tablet; Take 1 Tab by mouth two (2) times a day. Other orders 
-     triamcinolone acetonide (KENALOG) 0.1 % topical cream; Apply  to affected area two (2) times a day. use thin layer I have discussed the diagnosis with the patient and the intended plan as seen in the above orders. The patient has received an after-visit summary and questions were answered concerning future plans. I have discussed medication side effects and warnings with the patient as well. I have reviewed the plan of care with the patient, accepted their input and they are in agreement with the treatment goals. Follow-up Disposition: 
Return in about 3 months (around 4/8/2019) for rov, labs prior.

## 2019-04-02 LAB
A-G RATIO,AGRAT: 2.1 RATIO (ref 1.1–2.6)
ALBUMIN SERPL-MCNC: 4.5 G/DL (ref 3.5–5)
ALP SERPL-CCNC: 86 U/L (ref 40–120)
ALT SERPL-CCNC: 16 U/L (ref 5–40)
ANION GAP SERPL CALC-SCNC: 16 MMOL/L
AST SERPL W P-5'-P-CCNC: 13 U/L (ref 10–37)
AVG GLU, 10930: 120 MG/DL (ref 91–123)
BILIRUB SERPL-MCNC: 0.5 MG/DL (ref 0.2–1.2)
BUN SERPL-MCNC: 10 MG/DL (ref 6–22)
CALCIUM SERPL-MCNC: 9.1 MG/DL (ref 8.4–10.5)
CHLORIDE SERPL-SCNC: 103 MMOL/L (ref 98–110)
CHOLEST SERPL-MCNC: 195 MG/DL (ref 110–200)
CO2 SERPL-SCNC: 23 MMOL/L (ref 20–32)
CREAT SERPL-MCNC: 0.8 MG/DL (ref 0.8–1.4)
GFRAA, 66117: >60
GFRNA, 66118: >60
GLOBULIN,GLOB: 2.1 G/DL (ref 2–4)
GLUCOSE SERPL-MCNC: 133 MG/DL (ref 70–99)
HBA1C MFR BLD HPLC: 5.8 % (ref 4.8–5.9)
HDLC SERPL-MCNC: 37 MG/DL (ref 40–59)
HDLC SERPL-MCNC: 5.3 MG/DL (ref 0–5)
LDLC SERPL CALC-MCNC: 122 MG/DL (ref 50–99)
POTASSIUM SERPL-SCNC: 4.6 MMOL/L (ref 3.5–5.5)
PROT SERPL-MCNC: 6.6 G/DL (ref 6.2–8.1)
SODIUM SERPL-SCNC: 142 MMOL/L (ref 133–145)
T4 FREE SERPL-MCNC: 0.6 NG/DL (ref 0.9–1.8)
TRIGL SERPL-MCNC: 184 MG/DL (ref 40–149)
TSH SERPL DL<=0.005 MIU/L-ACNC: 0.17 MCU/ML (ref 0.27–4.2)
VLDLC SERPL CALC-MCNC: 37 MG/DL (ref 8–30)

## 2019-04-08 ENCOUNTER — OFFICE VISIT (OUTPATIENT)
Dept: FAMILY MEDICINE CLINIC | Age: 59
End: 2019-04-08

## 2019-04-08 VITALS
DIASTOLIC BLOOD PRESSURE: 57 MMHG | HEART RATE: 70 BPM | SYSTOLIC BLOOD PRESSURE: 97 MMHG | TEMPERATURE: 97.6 F | RESPIRATION RATE: 18 BRPM | BODY MASS INDEX: 27.1 KG/M2 | WEIGHT: 178.8 LBS | OXYGEN SATURATION: 98 % | HEIGHT: 68 IN

## 2019-04-08 DIAGNOSIS — N18.1 CONTROLLED TYPE 2 DIABETES MELLITUS WITH STAGE 1 CHRONIC KIDNEY DISEASE, WITHOUT LONG-TERM CURRENT USE OF INSULIN (HCC): ICD-10-CM

## 2019-04-08 DIAGNOSIS — F32.0 CURRENT MILD EPISODE OF MAJOR DEPRESSIVE DISORDER WITHOUT PRIOR EPISODE (HCC): ICD-10-CM

## 2019-04-08 DIAGNOSIS — E78.00 PURE HYPERCHOLESTEROLEMIA: Primary | ICD-10-CM

## 2019-04-08 DIAGNOSIS — K58.9 IRRITABLE BOWEL SYNDROME WITHOUT DIARRHEA: ICD-10-CM

## 2019-04-08 DIAGNOSIS — E11.22 CONTROLLED TYPE 2 DIABETES MELLITUS WITH STAGE 1 CHRONIC KIDNEY DISEASE, WITHOUT LONG-TERM CURRENT USE OF INSULIN (HCC): ICD-10-CM

## 2019-04-08 NOTE — PROGRESS NOTES
Room # SUBJECTIVE: 
 
Zachary Lamb is a 59 y.o. female who presents today for follow up for diabetes 1. Have you been to the ER, urgent care clinic since your last visit? Hospitalized since your last visit? NO 
 
2. Have you seen or consulted any other health care providers outside of the [address] [place] [address] since your last visit? Include any pap smears or colon screening. NO When : 
Reason: 
 
Health Maintenance reviewed Yes Health Maintenance Due Topic Date Due  Shingrix Vaccine Age 50> (1 of 2) 09/02/2008  FOOT EXAM Q1  09/25/2018  MICROALBUMIN Q1  03/15/2019

## 2019-09-13 ENCOUNTER — CLINICAL SUPPORT (OUTPATIENT)
Dept: FAMILY MEDICINE CLINIC | Age: 59
End: 2019-09-13

## 2019-09-13 VITALS
HEART RATE: 70 BPM | OXYGEN SATURATION: 99 % | BODY MASS INDEX: 26.98 KG/M2 | RESPIRATION RATE: 20 BRPM | DIASTOLIC BLOOD PRESSURE: 60 MMHG | SYSTOLIC BLOOD PRESSURE: 100 MMHG | TEMPERATURE: 98 F | WEIGHT: 178 LBS | HEIGHT: 68 IN

## 2019-09-13 DIAGNOSIS — Z23 ENCOUNTER FOR IMMUNIZATION: Primary | ICD-10-CM

## 2020-01-13 ENCOUNTER — OFFICE VISIT (OUTPATIENT)
Dept: FAMILY MEDICINE CLINIC | Age: 60
End: 2020-01-13

## 2020-01-13 VITALS
WEIGHT: 197 LBS | RESPIRATION RATE: 20 BRPM | DIASTOLIC BLOOD PRESSURE: 74 MMHG | HEART RATE: 78 BPM | SYSTOLIC BLOOD PRESSURE: 129 MMHG | HEIGHT: 68 IN | TEMPERATURE: 98.9 F | OXYGEN SATURATION: 99 % | BODY MASS INDEX: 29.86 KG/M2

## 2020-01-13 DIAGNOSIS — Z12.39 SCREENING FOR BREAST CANCER: ICD-10-CM

## 2020-01-13 DIAGNOSIS — F41.9 ANXIETY: ICD-10-CM

## 2020-01-13 DIAGNOSIS — N18.1 CONTROLLED TYPE 2 DIABETES MELLITUS WITH STAGE 1 CHRONIC KIDNEY DISEASE, WITHOUT LONG-TERM CURRENT USE OF INSULIN (HCC): ICD-10-CM

## 2020-01-13 DIAGNOSIS — E11.22 CONTROLLED TYPE 2 DIABETES MELLITUS WITH STAGE 1 CHRONIC KIDNEY DISEASE, WITHOUT LONG-TERM CURRENT USE OF INSULIN (HCC): ICD-10-CM

## 2020-01-13 DIAGNOSIS — E78.00 PURE HYPERCHOLESTEROLEMIA: ICD-10-CM

## 2020-01-13 DIAGNOSIS — E55.9 VITAMIN D DEFICIENCY: ICD-10-CM

## 2020-01-13 DIAGNOSIS — K58.2 IRRITABLE BOWEL SYNDROME WITH BOTH CONSTIPATION AND DIARRHEA: ICD-10-CM

## 2020-01-13 DIAGNOSIS — E11.22 CONTROLLED TYPE 2 DIABETES MELLITUS WITH STAGE 1 CHRONIC KIDNEY DISEASE, WITHOUT LONG-TERM CURRENT USE OF INSULIN (HCC): Primary | ICD-10-CM

## 2020-01-13 DIAGNOSIS — Z13.820 SCREENING FOR OSTEOPOROSIS: ICD-10-CM

## 2020-01-13 DIAGNOSIS — B18.2 HEP C W/O COMA, CHRONIC (HCC): ICD-10-CM

## 2020-01-13 DIAGNOSIS — N18.1 CONTROLLED TYPE 2 DIABETES MELLITUS WITH STAGE 1 CHRONIC KIDNEY DISEASE, WITHOUT LONG-TERM CURRENT USE OF INSULIN (HCC): Primary | ICD-10-CM

## 2020-01-13 DIAGNOSIS — L92.0 GRANULOMA ANNULARE: ICD-10-CM

## 2020-01-13 RX ORDER — AMITRIPTYLINE HYDROCHLORIDE 50 MG/1
50 TABLET, FILM COATED ORAL
Qty: 90 TAB | Refills: 4 | Status: SHIPPED | OUTPATIENT
Start: 2020-01-13 | End: 2020-12-14 | Stop reason: SDUPTHER

## 2020-01-13 RX ORDER — MULTIVIT WITH MINERALS/HERBS
1 TABLET ORAL DAILY
COMMUNITY

## 2020-01-13 RX ORDER — TRIAMCINOLONE ACETONIDE 1 MG/G
CREAM TOPICAL 2 TIMES DAILY
Qty: 453.6 G | Refills: 12 | Status: SHIPPED | OUTPATIENT
Start: 2020-01-13

## 2020-01-13 RX ORDER — SERTRALINE HYDROCHLORIDE 100 MG/1
100 TABLET, FILM COATED ORAL DAILY
Qty: 90 TAB | Refills: 4 | Status: SHIPPED | OUTPATIENT
Start: 2020-01-13 | End: 2020-12-14 | Stop reason: SDUPTHER

## 2020-01-13 RX ORDER — LEVOTHYROXINE SODIUM 100 UG/1
TABLET ORAL
COMMUNITY

## 2020-01-13 RX ORDER — SERTRALINE HYDROCHLORIDE 50 MG/1
50 TABLET, FILM COATED ORAL DAILY
Qty: 90 TAB | Refills: 1 | Status: SHIPPED | OUTPATIENT
Start: 2020-01-13 | End: 2020-12-14 | Stop reason: SDUPTHER

## 2020-01-13 NOTE — PROGRESS NOTES
Subjective     Patient ID:  Arslan Olmos is a 62 y.o. ( 1960) female who presents for the following:   Physical      HPI    Energy level very low since off of metformin and simvastatin, gained weight, burning feet. Works as CNA private duty. Sees [facility] for thyroid management and hormone replacement therapy. She takes progesterone p.o. and has testosterone and estrogen pellets implanted. Heart burn:   Taking 2 zantac tid     Depression/anxiety:  Helps, but anxiety is still high. Worse since stopping metformin and simvastatin. IBS:  Amitrymtiline helps. Alternating constipation and diarrhea with abdominal pain. Granuloma annulare  Kenalog effective, uses as needed    Hepatitis C:   Not active     Knee arthritis:  Knee wraps and bengay are reasonably effective. Overdue for pap smear:   Fearful due to painful exeperience last time. Diabetes, type 2 follow up:  Taking medications as prescribed: n/a  Following diabetic diet: Yes mostly, stopped soda, some potatoes over the holidays  Exercise: Yes, works as CNA. Checking blood sugar: No   Symptoms of hyperglycemia: none  Symptoms of hypoglycemia: none  Sees podiatry: No   Eye exam within the last year: was done  and result was right cataracts    Review of Systems   Constitutional: Positive for fatigue. Negative for appetite change, diaphoresis and unexpected weight change. Eyes: Negative for visual disturbance. Respiratory: Negative for cough, chest tightness and shortness of breath. Cardiovascular: Negative for chest pain, palpitations and leg swelling. Gastrointestinal: Negative for abdominal distention, abdominal pain, blood in stool, constipation, diarrhea, nausea, rectal pain and vomiting. Heartburn   Endocrine: Negative for polydipsia, polyphagia and polyuria. Genitourinary: Negative for decreased urine volume, dysuria and frequency. Musculoskeletal: Positive for arthralgias (Knees). Negative for joint swelling and myalgias. Skin: Positive for rash. Negative for wound. Neurological: Negative for dizziness, weakness, light-headedness, numbness and headaches. Psychiatric/Behavioral: Negative for dysphoric mood and sleep disturbance. The patient is nervous/anxious. Past Medical History, Past Surgery History, Allergies, Social History, and Family History were reviewed and updated. Past Medical History:   Diagnosis Date    Anxiety and depression     Arthritis     Depression     Depression 2/16/2015    Endometriosis     History of hepatitis C 10/24/2018    Pure hypercholesterolemia 2/16/2015    Sinus congestion      Past Surgical History:   Procedure Laterality Date    HX CHOLECYSTECTOMY      HX CHOLECYSTECTOMY      HX CYST INCISION AND DRAINAGE      rt 2010???? Family History   Problem Relation Age of Onset    Heart Disease Mother     Hypertension Father     Heart Disease Father     Diabetes Father     Cancer Paternal Grandfather     Breast Cancer Other         cousin and niece     Social History     Socioeconomic History    Marital status:      Spouse name: Not on file    Number of children: Not on file    Years of education: Not on file    Highest education level: Not on file   Occupational History    Not on file   Social Needs    Financial resource strain: Not on file    Food insecurity:     Worry: Not on file     Inability: Not on file    Transportation needs:     Medical: Not on file     Non-medical: Not on file   Tobacco Use    Smoking status: Former Smoker    Smokeless tobacco: Never Used   Substance and Sexual Activity    Alcohol use:  Yes     Alcohol/week: 2.5 standard drinks     Types: 3 drink(s) per week    Drug use: No    Sexual activity: Yes     Partners: Male   Lifestyle    Physical activity:     Days per week: Not on file     Minutes per session: Not on file    Stress: Not on file   Relationships    Social connections: Talks on phone: Not on file     Gets together: Not on file     Attends [employer] service: Not on file     Active member of club or organization: Not on file     Attends meetings of clubs or organizations: Not on file     Relationship status: Not on file    Intimate partner violence:     Fear of current or ex partner: Not on file     Emotionally abused: Not on file     Physically abused: Not on file     Forced sexual activity: Not on file   Other Topics Concern    Not on file   Social History Narrative    Not on file     Allergies   Allergen Reactions    Aspirin Other (comments)     Bleeding       Current Outpatient Medications on File Prior to Visit   Medication Sig Dispense Refill    levothyroxine (SYNTHROID) 100 mcg tablet Take  by mouth Daily (before breakfast).  ascorbic acid (VITAMIN C PO) Take  by mouth.  cholecalciferol, vitamin D3, (VITAMIN D3 PO) Take  by mouth.  MAGNESIUM CITRATE PO Take  by mouth.  b complex vitamins tablet Take 1 Tab by mouth daily.  raNITIdine (ZANTAC) 150 mg tablet Take 1 Tab by mouth two (2) times a day. (Patient taking differently: Take 300 mg by mouth two (2) times a day.) 180 Tab 4    progesterone (PROMETRIUM) 200 mg capsule Take 450 mg by mouth daily.  OTHER,NON-FORMULARY, Indications: testosterone and estradio pellets subQ      [DISCONTINUED] triamcinolone acetonide (KENALOG) 0.1 % topical cream Apply  to affected area two (2) times a day. use thin layer 453.6 g 12    [DISCONTINUED] amitriptyline (ELAVIL) 50 mg tablet Take 1 Tab by mouth nightly. 90 Tab 4    [DISCONTINUED] sertraline (ZOLOFT) 100 mg tablet Take 1 Tab by mouth daily. 90 Tab 4    [DISCONTINUED] thyroid, Pork, (ARMOUR) 60 mg tablet Take 90 mg by mouth daily. No current facility-administered medications on file prior to visit.         Objective     Visit Vitals  /74   Pulse 78   Temp 98.9 °F (37.2 °C) (Oral)   Resp 20   Ht 5' 8\" (1.727 m)   Wt 197 lb (89.4 kg)   SpO2 99%   BMI 29.95 kg/m²     No LMP recorded. (Menstrual status: Menopause). Physical Exam  Constitutional:       General: She is not in acute distress. Appearance: Normal appearance. She is well-developed. She is not diaphoretic. Eyes:      Conjunctiva/sclera: Conjunctivae normal.      Pupils: Pupils are equal, round, and reactive to light. Neck:      Vascular: No carotid bruit. Cardiovascular:      Rate and Rhythm: Normal rate and regular rhythm. Pulses: Normal pulses. Heart sounds: Normal heart sounds. No murmur. Pulmonary:      Effort: Pulmonary effort is normal. No respiratory distress. Breath sounds: Normal breath sounds. Abdominal:      General: Bowel sounds are normal. There is no distension. Palpations: Abdomen is soft. There is no mass. Tenderness: There is no tenderness. Skin:     General: Skin is warm and dry. Findings: Rash (raised erythematous rash in circular pattern to neck and upper extremities) present. Neurological:      Mental Status: She is alert and oriented to person, place, and time. Psychiatric:         Mood and Affect: Mood normal.         Behavior: Behavior normal.         Thought Content: Thought content normal.         Judgment: Judgment normal.         Diabetic Foot Exam  History  History of foot ulcers, wounds, or infections? No   History of peripheral vascular disease? No   Current symptoms? Tingling  Exam  Skin and nail inspection: within normal limits  Color: within normal limits  Deformities?  No   Range of motion and strength: within normal limits  [place] Touch Test: within normal limits  Pulses: present 2+  Temperature: within normal limits  Follow up  Referred to podiatry: No   Education for home foot care given: Yes    LABS   Component      Latest Ref Rng & Units 4/1/2019 4/1/2019 4/1/2019 4/1/2019          12:00 PM 12:00 PM 12:00 PM 12:00 PM   Glucose      70 - 99 mg/dL    133 (H)   BUN      6 - 22 mg/dL    10   Creatinine 0.8 - 1.4 mg/dL    0.8   Sodium      133 - 145 mmol/L    142   Potassium      3.5 - 5.5 mmol/L    4.6   Chloride      98 - 110 mmol/L    103   CO2      20 - 32 mmol/L    23   AST      10 - 37 U/L    13   ALT (SGPT)      5 - 40 U/L    16   Alk. phosphatase      40 - 120 U/L    86   Bilirubin, total      0.2 - 1.2 mg/dL    0.5   Calcium      8.4 - 10.5 mg/dL    9.1   Protein, total      6.2 - 8.1 g/dL    6.6   Albumin      3.5 - 5.0 g/dL    4.5   A-G Ratio      1.1 - 2.6 ratio    2.1   Globulin      2.0 - 4.0 g/dL    2.1   Anion gap      mmol/L    16.0   GFRAA      >60.0    >60.0   GFRNA      >60.0    >60.0   Triglyceride      40 - 149 mg/dL       HDL Cholesterol      40 - 59 mg/dL       Cholesterol, total      110 - 200 mg/dL       CHOLESTEROL/HDL      0.0 - 5.0       LDL, calculated      50 - 99 mg/dL       VLDL, calculated      8 - 30 mg/dL       Hemoglobin A1c, (calculated)      4.8 - 5.9 % 5.8      AVG GLU      91 - 123 mg/dL 120      T4, Free      0.9 - 1.8 ng/dL   0.6 (L)    TSH      0.27 - 4.20 mcU/mL  0.17 (L)       Component      Latest Ref Rng & Units 4/1/2019          12:00 PM   Glucose      70 - 99 mg/dL    BUN      6 - 22 mg/dL    Creatinine      0.8 - 1.4 mg/dL    Sodium      133 - 145 mmol/L    Potassium      3.5 - 5.5 mmol/L    Chloride      98 - 110 mmol/L    CO2      20 - 32 mmol/L    AST      10 - 37 U/L    ALT (SGPT)      5 - 40 U/L    Alk.  phosphatase      40 - 120 U/L    Bilirubin, total      0.2 - 1.2 mg/dL    Calcium      8.4 - 10.5 mg/dL    Protein, total      6.2 - 8.1 g/dL    Albumin      3.5 - 5.0 g/dL    A-G Ratio      1.1 - 2.6 ratio    Globulin      2.0 - 4.0 g/dL    Anion gap      mmol/L    GFRAA      >60.0    GFRNA      >60.0    Triglyceride      40 - 149 mg/dL 184 (H)   HDL Cholesterol      40 - 59 mg/dL 37 (L)   Cholesterol, total      110 - 200 mg/dL 195   CHOLESTEROL/HDL      0.0 - 5.0 5.3   LDL, calculated      50 - 99 mg/dL 122 (H)   VLDL, calculated      8 - 30 mg/dL 37 (H) Hemoglobin A1c, (calculated)      4.8 - 5.9 %    AVG GLU      91 - 123 mg/dL    T4, Free      0.9 - 1.8 ng/dL    TSH      0.27 - 4.20 mcU/mL      TESTS      Assessment and Plan     1. Controlled type 2 diabetes mellitus with stage 1 chronic kidney disease, without long-term current use of insulin ([facility] [address]  Return fasting for labs. Discuss restarting metformin after results. - CBC WITH AUTOMATED DIFF; Future  - METABOLIC PANEL, COMPREHENSIVE; Future  - URINALYSIS W/ RFLX MICROSCOPIC; Future  - HEMOGLOBIN A1C W/O EAG; Future    2. Anxiety  Increase sertraline to 150 mg daily. Follow-up in 3 months, sooner as needed for side effects or insufficient improvement in anxiety symptoms. - sertraline (ZOLOFT) 100 mg tablet; Take 1 Tab by mouth daily. Dispense: 90 Tab; Refill: 4  - sertraline (ZOLOFT) 50 mg tablet; Take 1 Tab by mouth daily. Take with 100mg tab for total of 150mg daily  Dispense: 90 Tab; Refill: 1    3. Pure hypercholesterolemia    - LIPID PANEL; Future    4. Vitamin D deficiency    - VITAMIN D, 25 HYDROXY; Future    5. Irritable bowel syndrome with both constipation and diarrhea  Reasonably well-controlled. Continue current medication. - amitriptyline (ELAVIL) 50 mg tablet; Take 1 Tab by mouth nightly. Dispense: 90 Tab; Refill: 4    7. Screening for breast cancer    - CLARKE MAMMO BI SCREENING INCL CAD; Future    8. Screening for osteoporosis    - DEXA BONE DENSITY STUDY AXIAL; Future    9. Granuloma annulare  Continue steroid cream as needed. Follow-up and Dispositions    · Return in about 3 months (around 4/13/2020) for fasting labs within 1-2 weeks, Medication follow up. Risks, benefits, and alternatives of the medications and treatment plan prescribed today were discussed, and patient expressed understanding. Printed after visit summary was given to patient and reviewed. All patient questions and concerns were addressed.  Plan follow-up as discussed or as needed if any worsening symptoms or change in condition.            Signed electronically by Nilay Soto DNP, FNP-BC

## 2020-01-17 LAB — HBA1C MFR BLD HPLC: 7.2 %

## 2020-03-16 ENCOUNTER — HOSPITAL ENCOUNTER (OUTPATIENT)
Dept: MAMMOGRAPHY | Age: 60
Discharge: HOME | End: 2020-03-16
Attending: NURSE PRACTITIONER
Payer: COMMERCIAL

## 2020-03-16 ENCOUNTER — TELEPHONE (OUTPATIENT)
Dept: FAMILY MEDICINE CLINIC | Age: 60
End: 2020-03-16

## 2020-03-16 ENCOUNTER — HOSPITAL ENCOUNTER (OUTPATIENT)
Dept: GENERAL RADIOLOGY | Age: 60
Discharge: HOME | End: 2020-03-16
Attending: NURSE PRACTITIONER
Payer: COMMERCIAL

## 2020-03-16 DIAGNOSIS — Z12.39 SCREENING FOR BREAST CANCER: ICD-10-CM

## 2020-03-16 DIAGNOSIS — Z13.820 SCREENING FOR OSTEOPOROSIS: ICD-10-CM

## 2020-03-16 PROCEDURE — 77080 DXA BONE DENSITY AXIAL: CPT

## 2020-03-16 PROCEDURE — 77063 BREAST TOMOSYNTHESIS BI: CPT

## 2020-12-14 ENCOUNTER — OFFICE VISIT (OUTPATIENT)
Dept: FAMILY MEDICINE CLINIC | Age: 60
End: 2020-12-14
Payer: COMMERCIAL

## 2020-12-14 VITALS
RESPIRATION RATE: 20 BRPM | OXYGEN SATURATION: 97 % | HEART RATE: 77 BPM | WEIGHT: 199.4 LBS | SYSTOLIC BLOOD PRESSURE: 120 MMHG | BODY MASS INDEX: 30.32 KG/M2 | DIASTOLIC BLOOD PRESSURE: 87 MMHG | TEMPERATURE: 98 F

## 2020-12-14 DIAGNOSIS — F41.9 ANXIETY: ICD-10-CM

## 2020-12-14 DIAGNOSIS — R30.0 DYSURIA: ICD-10-CM

## 2020-12-14 DIAGNOSIS — B37.49 GENITAL CANDIDIASIS: ICD-10-CM

## 2020-12-14 DIAGNOSIS — N30.00 ACUTE CYSTITIS WITHOUT HEMATURIA: ICD-10-CM

## 2020-12-14 DIAGNOSIS — N18.1 CONTROLLED TYPE 2 DIABETES MELLITUS WITH STAGE 1 CHRONIC KIDNEY DISEASE, WITHOUT LONG-TERM CURRENT USE OF INSULIN (HCC): Primary | ICD-10-CM

## 2020-12-14 DIAGNOSIS — E11.22 CONTROLLED TYPE 2 DIABETES MELLITUS WITH STAGE 1 CHRONIC KIDNEY DISEASE, WITHOUT LONG-TERM CURRENT USE OF INSULIN (HCC): Primary | ICD-10-CM

## 2020-12-14 DIAGNOSIS — K58.2 IRRITABLE BOWEL SYNDROME WITH BOTH CONSTIPATION AND DIARRHEA: ICD-10-CM

## 2020-12-14 LAB
BILIRUB UR QL STRIP: NORMAL
GLUCOSE UR-MCNC: NORMAL MG/DL
KETONES P FAST UR STRIP-MCNC: NORMAL MG/DL
PH UR STRIP: 6 [PH] (ref 4.6–8)
PROT UR QL STRIP: NEGATIVE
SP GR UR STRIP: 1.02 (ref 1–1.03)
UA UROBILINOGEN AMB POC: NORMAL (ref 0.2–1)
URINALYSIS CLARITY POC: CLEAR
URINALYSIS COLOR POC: NORMAL
URINE BLOOD POC: NEGATIVE
URINE LEUKOCYTES POC: NORMAL
URINE NITRITES POC: NEGATIVE

## 2020-12-14 PROCEDURE — 99214 OFFICE O/P EST MOD 30 MIN: CPT | Performed by: NURSE PRACTITIONER

## 2020-12-14 PROCEDURE — 81003 URINALYSIS AUTO W/O SCOPE: CPT | Performed by: NURSE PRACTITIONER

## 2020-12-14 PROCEDURE — 3051F HG A1C>EQUAL 7.0%<8.0%: CPT | Performed by: NURSE PRACTITIONER

## 2020-12-14 RX ORDER — SERTRALINE HYDROCHLORIDE 50 MG/1
50 TABLET, FILM COATED ORAL DAILY
Qty: 90 TAB | Refills: 1 | Status: SHIPPED | OUTPATIENT
Start: 2020-12-14 | End: 2021-07-01 | Stop reason: SDUPTHER

## 2020-12-14 RX ORDER — IBUPROFEN 800 MG/1
800 TABLET ORAL
Qty: 30 TAB | Refills: 0 | Status: SHIPPED | OUTPATIENT
Start: 2020-12-14

## 2020-12-14 RX ORDER — NYSTATIN 100000 [USP'U]/G
POWDER TOPICAL 2 TIMES DAILY
Qty: 60 G | Refills: 0 | Status: SHIPPED | OUTPATIENT
Start: 2020-12-14 | End: 2021-01-04 | Stop reason: SDUPTHER

## 2020-12-14 RX ORDER — CEPHALEXIN 500 MG/1
500 CAPSULE ORAL 4 TIMES DAILY
Qty: 40 CAP | Refills: 0 | Status: SHIPPED | OUTPATIENT
Start: 2020-12-14 | End: 2020-12-24

## 2020-12-14 RX ORDER — INSULIN PUMP SYRINGE, 3 ML
EACH MISCELLANEOUS
Qty: 1 KIT | Refills: 0 | Status: SHIPPED | OUTPATIENT
Start: 2020-12-14

## 2020-12-14 RX ORDER — AMITRIPTYLINE HYDROCHLORIDE 50 MG/1
50 TABLET, FILM COATED ORAL
Qty: 90 TAB | Refills: 4 | Status: SHIPPED | OUTPATIENT
Start: 2020-12-14 | End: 2021-07-01 | Stop reason: SDUPTHER

## 2020-12-14 RX ORDER — FLUCONAZOLE 150 MG/1
150 TABLET ORAL
Qty: 2 TAB | Refills: 0 | Status: SHIPPED | OUTPATIENT
Start: 2020-12-14 | End: 2020-12-18

## 2020-12-14 RX ORDER — NYSTATIN 100000 [USP'U]/G
POWDER TOPICAL 2 TIMES DAILY
COMMUNITY
Start: 2020-12-08 | End: 2020-12-14 | Stop reason: SDUPTHER

## 2020-12-14 RX ORDER — METFORMIN HYDROCHLORIDE 500 MG/1
500 TABLET ORAL 2 TIMES DAILY
COMMUNITY
Start: 2020-12-08 | End: 2020-12-17 | Stop reason: SDUPTHER

## 2020-12-14 RX ORDER — SERTRALINE HYDROCHLORIDE 100 MG/1
100 TABLET, FILM COATED ORAL DAILY
Qty: 90 TAB | Refills: 4 | Status: SHIPPED | OUTPATIENT
Start: 2020-12-14 | End: 2022-01-03 | Stop reason: SDUPTHER

## 2020-12-14 NOTE — PROGRESS NOTES
Subjective     Patient ID:  Linford Cooks is a 56 y.o. ( 1960) female who presents for the following:   Diabetes      HPI     Presents for ER follow up. She went with rash and pain in the genital area. Treated with antifungal and antibiotic for cellulitis and fungal rash. Her glucose was also elevated and was given IV insulin until it was controlled. The symptoms did resolve completely then 3-4 days ago started having itching and burning in the genital area again. Denies any vaginal discharge. Reports dryness. Has urinary frequency although does not think it is any different than her baseline. Still using Nystatin. Diabetes, type 2 follow up:  Last A1C was 7.2 in    Taking medications as prescribed: n/a  Following diabetic diet: no  Exercise: no  Checking blood sugar: No   Symptoms of hyperglycemia: none  Symptoms of hypoglycemia: none  Sees podiatry: No   Eye exam within the last year: was done  and result was right cataracts    Fatigue:   Sleeping about 17 hours per day and still very tired. Previously was getting hormone replacement and thyroid medications from [facility] and [facility] [address] [facility]. She just restarted the hormones but continues with the fatigue. Depression/anxiety:  Sertraline helps somewhat, but anxiety is still high. She felt a little better with sertraline 150mg. Currently on 100 mg. Review of Systems   Constitutional: Positive for fatigue. Negative for appetite change, diaphoresis and unexpected weight change. Eyes: Negative for visual disturbance. Respiratory: Negative for cough, chest tightness and shortness of breath. Cardiovascular: Negative for chest pain, palpitations and leg swelling. Gastrointestinal: Negative for abdominal distention, abdominal pain, blood in stool, constipation, diarrhea, nausea, rectal pain and vomiting. Endocrine: Negative for polydipsia, polyphagia and polyuria.    Genitourinary: Positive for dysuria and genital sores. Negative for decreased urine volume, frequency, menstrual problem, pelvic pain, urgency, vaginal bleeding, vaginal discharge and vaginal pain. Musculoskeletal: Negative for joint swelling and myalgias. Skin: Positive for rash. Negative for wound. Neurological: Negative for dizziness, weakness, light-headedness, numbness and headaches. Psychiatric/Behavioral: Positive for dysphoric mood. Negative for sleep disturbance and suicidal ideas. The patient is not nervous/anxious. Past Medical History, Past Surgery History, Allergies, Social History, and Family History were reviewed and updated. Patient Active Problem List   Diagnosis Code    Pure hypercholesterolemia E78.00    Diabetes mellitus type 2, controlled ([facility] [address] E11.9    IBS (irritable bowel syndrome) K58.9    Depression F32.9    Type 2 diabetes with nephropathy [facility] E11.21    History of hepatitis C Z86.19     Past Medical History:   Diagnosis Date    Anxiety and depression     Arthritis     Depression     Depression 2/16/2015    Endometriosis     History of hepatitis C 10/24/2018    Menopause     Pure hypercholesterolemia 2/16/2015    Sinus congestion      Patient Care Team:  Allie Robison NP as PCP - General (Nurse Practitioner)  Allie Robison NP as PCP - [facility] Danitza Guevaraled Provider  Susie Ramírez MD (Dermatology)    Past Surgical History:   Procedure Laterality Date    HX CHOLECYSTECTOMY      HX CHOLECYSTECTOMY      HX CYST INCISION AND DRAINAGE      rt 2010???? Family History   Problem Relation Age of Onset    Heart Disease Mother     Osteoporosis Mother     Hypertension Father     Heart Disease Father     Diabetes Father     Cancer Paternal Grandfather     Breast Cancer Other         cousin and niece     Social History     Tobacco Use    Smoking status: Former Smoker    Smokeless tobacco: Never Used   Substance Use Topics    Alcohol use:  Yes     Alcohol/week: 2.5 standard drinks Types: 3 drink(s) per week    Drug use: No     Allergies   Allergen Reactions    Aspirin Other (comments)     Bleeding       Current Outpatient Medications on File Prior to Visit   Medication Sig Dispense Refill    metFORMIN (GLUCOPHAGE) 500 mg tablet Take 500 mg by mouth two (2) times a day.  levothyroxine (SYNTHROID) 100 mcg tablet Take  by mouth Daily (before breakfast).  ascorbic acid (VITAMIN C PO) Take  by mouth.  cholecalciferol, vitamin D3, (VITAMIN D3 PO) Take  by mouth.  MAGNESIUM CITRATE PO Take  by mouth.  b complex vitamins tablet Take 1 Tab by mouth daily.  triamcinolone acetonide (KENALOG) 0.1 % topical cream Apply  to affected area two (2) times a day. use thin layer 453.6 g 12    progesterone (PROMETRIUM) 200 mg capsule Take 450 mg by mouth daily.  OTHER,NON-FORMULARY, Indications: testosterone and estradio pellets subQ      [DISCONTINUED] nystatin (MYCOSTATIN) powder Apply  to affected area two (2) times a day.  [DISCONTINUED] sertraline (ZOLOFT) 100 mg tablet Take 1 Tab by mouth daily. 90 Tab 4    [DISCONTINUED] sertraline (ZOLOFT) 50 mg tablet Take 1 Tab by mouth daily. Take with 100mg tab for total of 150mg daily 90 Tab 1    [DISCONTINUED] amitriptyline (ELAVIL) 50 mg tablet Take 1 Tab by mouth nightly. 90 Tab 4    raNITIdine (ZANTAC) 150 mg tablet Take 1 Tab by mouth two (2) times a day. (Patient taking differently: Take 300 mg by mouth two (2) times a day.) 180 Tab 4     No current facility-administered medications on file prior to visit. Health Maintenance Due   Topic Date Due    Shingrix Vaccine Age 49> (1 of 2) 09/02/2008    Foot Exam Q1  09/25/2018    MICROALBUMIN Q1  03/15/2019    Flu Vaccine (1) 09/01/2020    Eye Exam Retinal or Dilated  10/24/2020         Objective     Visit Vitals  /87   Pulse 77   Temp 98 °F (36.7 °C)   Resp 20   Wt 199 lb 6.4 oz (90.4 kg)   SpO2 97%   BMI 30.32 kg/m²     No LMP recorded.  (Menstrual status: Menopause). Physical Exam  Constitutional:       General: She is not in acute distress. Appearance: Normal appearance. She is well-developed. She is not diaphoretic. Eyes:      Conjunctiva/sclera: Conjunctivae normal.      Pupils: Pupils are equal, round, and reactive to light. Neck:      Vascular: No carotid bruit. Cardiovascular:      Rate and Rhythm: Normal rate and regular rhythm. Pulses: Normal pulses. Heart sounds: Normal heart sounds. No murmur. Pulmonary:      Effort: Pulmonary effort is normal. No respiratory distress. Breath sounds: Normal breath sounds. Abdominal:      General: Bowel sounds are normal. There is no distension. Palpations: Abdomen is soft. There is no mass. Tenderness: There is no abdominal tenderness. Genitourinary:     Labia:         Right: Rash and tenderness present. No lesion or injury. Left: Rash and tenderness present. No lesion or injury. Vagina: No signs of injury and foreign body. Tenderness present. No vaginal discharge, erythema or bleeding. Comments: Diffuse erythematous rash of the external genitalia consistent with genital candidiasis. Moderate tenderness. Skin:     General: Skin is warm and dry. Findings: No rash. Neurological:      Mental Status: She is alert and oriented to person, place, and time. LABS     TESTS      Assessment and Plan     1. Controlled type 2 diabetes mellitus with stage 1 chronic kidney disease, without long-term current use of insulin ([address]  Work on diet and exercise. Check A1C today. Consider increase in metformin dose after results. Start check glucose daily and keep a log.   - HEMOGLOBIN A1C W/O EAG; Future  - LIPID PANEL; Future  - Blood-Glucose Meter monitoring kit; Use to check blood sugar 1 time(s) a day and as needed. Please dispense any reliable brand glucometer covered by patients insurance. Dispense: 1 Kit;  Refill: 0  - LIPID PANEL  - HEMOGLOBIN A1C W/O EAG    2. Anxiety  Increase to 150 mg daily. - sertraline (ZOLOFT) 50 mg tablet; Take 1 Tab by mouth daily. Take with 100mg tab for total of 150mg daily  Dispense: 90 Tab; Refill: 1  - sertraline (Zoloft) 100 mg tablet; Take 1 Tab by mouth daily. Dispense: 90 Tab; Refill: 4    3. Irritable bowel syndrome with both constipation and diarrhea  continue  - amitriptyline (ELAVIL) 50 mg tablet; Take 1 Tab by mouth nightly. Dispense: 90 Tab; Refill: 4    4. Dysuria  UA suggestive of UTI  - NUSWAB VAGINITIS; Future  - CULTURE, URINE; Future  - AMB POC URINALYSIS DIP STICK AUTO W/ MICRO  - CULTURE, URINE  - NUSWAB VAGINITIS    5. Acute cystitis without hematuria  Keflex for UTI. Motrin prn pain. - cephALEXin (KEFLEX) 500 mg capsule; Take 1 Cap by mouth four (4) times daily for 10 days. Dispense: 40 Cap; Refill: 0  - ibuprofen (MOTRIN) 800 mg tablet; Take 1 Tab by mouth every eight (8) hours as needed for Pain. Dispense: 30 Tab; Refill: 0    6. Genital candidiasis  - fluconazole (DIFLUCAN) 150 mg tablet; Take 1 Tab by mouth every three (3) days for 2 doses. Dispense: 2 Tab; Refill: 0  - nystatin (MYCOSTATIN) powder; Apply  to affected area two (2) times a day. Dispense: 60 g; Refill: 0      Follow-up and Dispositions    · Return in about 2 weeks (around 12/28/2020) for Diabetes follow up . Risks, benefits, and alternatives of the medications and treatment plan prescribed today were discussed, and patient expressed understanding. Printed after visit summary was given to patient and reviewed. All patient questions and concerns were addressed. Plan follow-up as discussed or as needed if any worsening symptoms or change in condition.            Signed electronically by Ana Bethea, APOLONIA, AMRIT-BC

## 2020-12-14 NOTE — PROGRESS NOTES
Ilene Greene presents today for   Chief Complaint   Patient presents with    Diabetes       Is someone accompanying this pt? no    Is the patient using any DME equipment during OV? no    Depression Screening:  3 most recent PHQ Screens 3/15/2016   Little interest or pleasure in doing things Not at all   Feeling down, depressed, irritable, or hopeless Not at all   Total Score PHQ 2 0       Learning Assessment:  Learning Assessment 2/16/2015   PRIMARY LEARNER Patient   HIGHEST LEVEL OF EDUCATION - PRIMARY LEARNER  [facility] PRIMARY LEARNER COGNITIVE   CO-LEARNER CAREGIVER No   PRIMARY LANGUAGE ENGLISH   LEARNER PREFERENCE PRIMARY DEMONSTRATION       Abuse Screening:  Abuse Screening Questionnaire 2/16/2015   Do you ever feel afraid of your partner? N   Are you in a relationship with someone who physically or mentally threatens you? N   Is it safe for you to go home? Y       Fall Risk  Fall Risk Assessment, last 12 mths 3/15/2016   Able to walk? Yes   Fall in past 12 months? No       Health Maintenance reviewed and discussed and ordered per Provider. Health Maintenance Due   Topic Date Due    Shingrix Vaccine Age 49> (1 of 2) 09/02/2008    Foot Exam Q1  09/25/2018    MICROALBUMIN Q1  03/15/2019    Flu Vaccine (1) 09/01/2020    Eye Exam Retinal or Dilated  10/24/2020   . Coordination of Care:  1. Have you been to the ER, urgent care clinic since your last visit? Hospitalized since your last visit? yes    2. Have you seen or consulted any other health care providers outside of the [facility] since your last visit? Include any pap smears or colon screening.  no      Last  Checked na  Last UDS Checked na  Last Pain contract signed: benjamin

## 2020-12-14 NOTE — LETTER
12/14/2020 9:48 AM 
 
Ms. Patricia Ferrara [address] [facility] [place] 
[address] December 14, 2020 RE: Patricia Ferrara To Whom It May Concern, This is to certify that Patricia Ferrara may may return to work on 1/21/20. Please feel free to contact my office if you have any questions or concerns. Thank you for your assistance in this matter.  
 
 
Sincerely, 
Addison Henderson NP

## 2020-12-14 NOTE — LETTER
12/14/2020 9:50 AM 
 
Ms. Madalyn Morales [address] [facility] [address] December 14, 2020 RE: Madalyn Morales To Whom It May Concern, This is to certify that Madalyn Morales may return to work on 12/21/20. Please feel free to contact my office if you have any questions or concerns. Thank you for your assistance in this matter.  
 
 
Sincerely, 
Marielos Sarmiento NP

## 2020-12-14 NOTE — PATIENT INSTRUCTIONS
Urinary Tract Infection in Women: Care Instructions Your Care Instructions A urinary tract infection, or UTI, is a general term for an infection anywhere between the kidneys and the urethra (where urine comes out). Most UTIs are bladder infections. They often cause pain or burning when you urinate. UTIs are caused by bacteria and can be cured with antibiotics. Be sure to complete your treatment so that the infection goes away. Follow-up care is a key part of your treatment and safety. Be sure to make and go to all appointments, and call your doctor if you are having problems. It's also a good idea to know your test results and keep a list of the medicines you take. How can you care for yourself at home? · Take your antibiotics as directed. Do not stop taking them just because you feel better. You need to take the full course of antibiotics. · Drink extra water and other fluids for the next day or two. This may help wash out the bacteria that are causing the infection. (If you have kidney, heart, or liver disease and have to limit fluids, talk with your doctor before you increase your fluid intake.) · Avoid drinks that are carbonated or have caffeine. They can irritate the bladder. · Urinate often. Try to empty your bladder each time. · To relieve pain, take a hot bath or lay a heating pad set on low over your lower belly or genital area. Never go to sleep with a heating pad in place. To prevent UTIs · Drink plenty of water each day. This helps you urinate often, which clears bacteria from your system. (If you have kidney, heart, or liver disease and have to limit fluids, talk with your doctor before you increase your fluid intake.) · Urinate when you need to. · Urinate right after you have sex. · Change sanitary pads often. · Avoid douches, bubble baths, feminine hygiene sprays, and other feminine hygiene products that have deodorants. · After going to the bathroom, wipe from front to back. When should you call for help? Call your doctor now or seek immediate medical care if: 
  · Symptoms such as fever, chills, nausea, or vomiting get worse or appear for the first time.  
  · You have new pain in your back just below your rib cage. This is called flank pain.  
  · There is new blood or pus in your urine.  
  · You have any problems with your antibiotic medicine. Watch closely for changes in your health, and be sure to contact your doctor if: 
  · You are not getting better after taking an antibiotic for 2 days.  
  · Your symptoms go away but then come back. Where can you learn more? Go to http://www.gray.com/ Enter Y903 in the search box to learn more about \"Urinary Tract Infection in Women: Care Instructions. \" Current as of: June 29, 2020               Content Version: 12.6 © 7612-4924 [employer]. Care instructions adapted under license by [employer] (which disclaims liability or warranty for this information). If you have questions about a medical condition or this instruction, always ask your healthcare professional. [address] any warranty or liability for your use of this information.

## 2020-12-15 ENCOUNTER — TELEPHONE (OUTPATIENT)
Dept: FAMILY MEDICINE CLINIC | Age: 60
End: 2020-12-15

## 2020-12-15 LAB
AVG GLU, 10930: 245 MG/DL (ref 91–123)
CHOLEST SERPL-MCNC: 177 MG/DL (ref 110–200)
HBA1C MFR BLD HPLC: 10.2 % (ref 4.8–5.6)
HDLC SERPL-MCNC: 32 MG/DL
HDLC SERPL-MCNC: 5.5 MG/DL (ref 0–5)
LDL/HDL RATIO,LDHD: 3.1
LDLC SERPL CALC-MCNC: 99 MG/DL (ref 50–99)
NON-HDL CHOLESTEROL, 011976: 145 MG/DL
TRIGL SERPL-MCNC: 230 MG/DL (ref 40–149)
VLDLC SERPL CALC-MCNC: 46 MG/DL (ref 8–30)

## 2020-12-15 NOTE — TELEPHONE ENCOUNTER
Pt. Stated the clinic called her with a diagnosis that she did not want to go into detail with but she would like to speak with nurse regarding this matter.  Please assist.

## 2020-12-16 LAB — RESULT: ABNORMAL

## 2020-12-16 NOTE — TELEPHONE ENCOUNTER
Pt. Is calling back to speak with nurse. She also stated she did not receive prescription for lancets and test strips.  Please assist.

## 2020-12-17 ENCOUNTER — TELEPHONE (OUTPATIENT)
Dept: FAMILY MEDICINE CLINIC | Age: 60
End: 2020-12-17

## 2020-12-17 DIAGNOSIS — E11.22 CONTROLLED TYPE 2 DIABETES MELLITUS WITH STAGE 1 CHRONIC KIDNEY DISEASE, WITHOUT LONG-TERM CURRENT USE OF INSULIN (HCC): Primary | ICD-10-CM

## 2020-12-17 DIAGNOSIS — E78.00 PURE HYPERCHOLESTEROLEMIA: ICD-10-CM

## 2020-12-17 DIAGNOSIS — N18.1 CONTROLLED TYPE 2 DIABETES MELLITUS WITH STAGE 1 CHRONIC KIDNEY DISEASE, WITHOUT LONG-TERM CURRENT USE OF INSULIN (HCC): Primary | ICD-10-CM

## 2020-12-17 RX ORDER — LANCETS
EACH MISCELLANEOUS
Qty: 200 EACH | Refills: 11 | Status: SHIPPED | OUTPATIENT
Start: 2020-12-17

## 2020-12-17 RX ORDER — ATORVASTATIN CALCIUM 10 MG/1
10 TABLET, FILM COATED ORAL
Qty: 30 TAB | Refills: 2 | Status: SHIPPED | OUTPATIENT
Start: 2020-12-17 | End: 2021-05-14 | Stop reason: SDUPTHER

## 2020-12-17 RX ORDER — METFORMIN HYDROCHLORIDE 1000 MG/1
1000 TABLET ORAL 2 TIMES DAILY WITH MEALS
Qty: 60 TAB | Refills: 2 | Status: SHIPPED | OUTPATIENT
Start: 2020-12-17 | End: 2021-01-18 | Stop reason: SDUPTHER

## 2020-12-17 NOTE — TELEPHONE ENCOUNTER
Called patient gave lab results. [facility] and was told she had herpes 1 . She was devastated.

## 2020-12-17 NOTE — TELEPHONE ENCOUNTER
Return patient call left message that nurse sent request for lancets and strips to [facility].

## 2020-12-21 LAB
BACTERIAL VAGINOSIS, NAA: ABNORMAL
CANDIDA ALBICANS, NAA, 180056: POSITIVE
CANDIDA GLABRATA, NAA, 180057: NEGATIVE
CANDIDA KRUSEI, NAA, 180016: NEGATIVE
TRICH VAG BY NAA, 180087: NEGATIVE

## 2020-12-23 ENCOUNTER — TELEPHONE (OUTPATIENT)
Dept: FAMILY MEDICINE CLINIC | Age: 60
End: 2020-12-23

## 2020-12-23 NOTE — TELEPHONE ENCOUNTER
Called patient told lab results . States she is still having itching off and on but feels as long as her sugar is up she will have the problem.

## 2021-01-04 ENCOUNTER — OFFICE VISIT (OUTPATIENT)
Dept: FAMILY MEDICINE CLINIC | Age: 61
End: 2021-01-04
Payer: COMMERCIAL

## 2021-01-04 VITALS
OXYGEN SATURATION: 99 % | DIASTOLIC BLOOD PRESSURE: 80 MMHG | HEART RATE: 96 BPM | RESPIRATION RATE: 20 BRPM | SYSTOLIC BLOOD PRESSURE: 140 MMHG | TEMPERATURE: 97.9 F | BODY MASS INDEX: 29.98 KG/M2 | WEIGHT: 197.2 LBS

## 2021-01-04 DIAGNOSIS — N18.1 CONTROLLED TYPE 2 DIABETES MELLITUS WITH STAGE 1 CHRONIC KIDNEY DISEASE, WITHOUT LONG-TERM CURRENT USE OF INSULIN (HCC): Primary | ICD-10-CM

## 2021-01-04 DIAGNOSIS — R30.0 DYSURIA: ICD-10-CM

## 2021-01-04 DIAGNOSIS — R35.0 URINE FREQUENCY: ICD-10-CM

## 2021-01-04 DIAGNOSIS — E11.22 CONTROLLED TYPE 2 DIABETES MELLITUS WITH STAGE 1 CHRONIC KIDNEY DISEASE, WITHOUT LONG-TERM CURRENT USE OF INSULIN (HCC): Primary | ICD-10-CM

## 2021-01-04 DIAGNOSIS — B37.49 GENITAL CANDIDIASIS: ICD-10-CM

## 2021-01-04 LAB
BILIRUB UR QL STRIP: NEGATIVE
GLUCOSE UR-MCNC: NORMAL MG/DL
KETONES P FAST UR STRIP-MCNC: NORMAL MG/DL
PH UR STRIP: 5 [PH] (ref 4.6–8)
PROT UR QL STRIP: NEGATIVE
SP GR UR STRIP: 1.02 (ref 1–1.03)
UA UROBILINOGEN AMB POC: NORMAL (ref 0.2–1)
URINALYSIS CLARITY POC: NORMAL
URINALYSIS COLOR POC: YELLOW
URINE BLOOD POC: NEGATIVE
URINE LEUKOCYTES POC: NORMAL
URINE NITRITES POC: NEGATIVE

## 2021-01-04 PROCEDURE — 99214 OFFICE O/P EST MOD 30 MIN: CPT | Performed by: NURSE PRACTITIONER

## 2021-01-04 PROCEDURE — 81003 URINALYSIS AUTO W/O SCOPE: CPT | Performed by: NURSE PRACTITIONER

## 2021-01-04 RX ORDER — GLIPIZIDE 5 MG/1
5 TABLET ORAL 2 TIMES DAILY
Qty: 60 TAB | Refills: 2 | Status: SHIPPED | OUTPATIENT
Start: 2021-01-04 | End: 2021-03-22

## 2021-01-04 RX ORDER — NYSTATIN 100000 [USP'U]/G
POWDER TOPICAL 2 TIMES DAILY
Qty: 60 G | Refills: 0 | Status: SHIPPED | OUTPATIENT
Start: 2021-01-04 | End: 2022-08-23 | Stop reason: SDUPTHER

## 2021-01-04 RX ORDER — FLUCONAZOLE 150 MG/1
150 TABLET ORAL
Qty: 3 TAB | Refills: 0 | Status: SHIPPED | OUTPATIENT
Start: 2021-01-04 | End: 2021-01-11

## 2021-01-04 NOTE — PROGRESS NOTES
Subjective     Patient ID:  Hannah Taylor is a 56 y.o. ( 1960) female who presents for the following: Follow-up and Diabetes      HPI     Vaginal yeast infection:   I treated her with diflucan and nystatin powder. Symptoms resolved, but returned after stopping the medication. Dysuria:   Continued intermittently for the last few weeks. Also having frequency, nausea. 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 Works 3 days a week. On her off days she sleeps at night. Her glucose has been running in the 200s. I increased her metformin 2 weeks ago to 1000 mg BID which she has tolerated well. Review of Systems   Constitutional: Negative for appetite change, chills, diaphoresis, fatigue, fever and unexpected weight change. Eyes: Negative for visual disturbance. Respiratory: Negative for cough, chest tightness and shortness of breath. Cardiovascular: Negative for chest pain, palpitations and leg swelling. Gastrointestinal: Positive for nausea. Negative for abdominal distention, abdominal pain, blood in stool, constipation, diarrhea, rectal pain and vomiting. Endocrine: Negative for polydipsia, polyphagia and polyuria. Genitourinary: Positive for dysuria, frequency and vaginal pain. Negative for decreased urine volume, difficulty urinating, dyspareunia, flank pain, genital sores, hematuria, menstrual problem, pelvic pain, urgency, vaginal bleeding and vaginal discharge. Musculoskeletal: Negative for back pain, joint swelling and myalgias. Skin: Negative for rash and wound. Neurological: Negative for dizziness, weakness, light-headedness, numbness and headaches. Psychiatric/Behavioral: Negative for dysphoric mood and sleep disturbance. The patient is not nervous/anxious. Past Medical History, Past Surgery History, Allergies, Social History, and Family History were reviewed and updated.       Patient Active Problem List   Diagnosis Code    Pure hypercholesterolemia E78.00    Diabetes mellitus type 2, controlled ([facility] [address] E11.9    IBS (irritable bowel syndrome) K58.9    Depression F32.9    Type 2 diabetes with nephropathy ([facility] [address] E11.21    History of hepatitis C Z86.19     Past Medical History:   Diagnosis Date    Anxiety and depression     Arthritis     Depression     Depression 2/16/2015    Endometriosis     History of hepatitis C 10/24/2018    Menopause     Pure hypercholesterolemia 2/16/2015    Sinus congestion      Patient Care Team:  Aidan Atkins NP as PCP - General (Nurse Practitioner)  Aidan Atkins NP as PCP - [facility]  Serg Willoughby MD (Dermatology)    Past Surgical History:   Procedure Laterality Date    HX CHOLECYSTECTOMY      HX CHOLECYSTECTOMY      HX CYST INCISION AND DRAINAGE      rt 2010???? Family History   Problem Relation Age of Onset    Heart Disease Mother     Osteoporosis Mother     Hypertension Father     Heart Disease Father     Diabetes Father     Cancer Paternal Grandfather     Breast Cancer Other         cousin and niece     Social History     Tobacco Use    Smoking status: Former Smoker    Smokeless tobacco: Never Used   Substance Use Topics    Alcohol use: Yes     Alcohol/week: 2.5 standard drinks     Types: 3 drink(s) per week    Drug use: No     Allergies   Allergen Reactions    Aspirin Other (comments)     Bleeding       Current Outpatient Medications on File Prior to Visit   Medication Sig Dispense Refill    atorvastatin (LIPITOR) 10 mg tablet Take 1 Tab by mouth nightly. 30 Tab 2    metFORMIN (GLUCOPHAGE) 1,000 mg tablet Take 1 Tab by mouth two (2) times daily (with meals). 60 Tab 2    glucose blood VI test strips (ASCENSIA AUTODISC VI, ONE TOUCH ULTRA TEST VI) strip Onetouch Ultra2 to fit pt's glucometer test twice a day  Dx E11.21 200 Strip 11    lancets misc Use to check blood sugar 2 time(s) a day and as needed.  Please dispense brand compatible with patient's equipment. 200 Each 11    sertraline (ZOLOFT) 50 mg tablet Take 1 Tab by mouth daily. Take with 100mg tab for total of 150mg daily 90 Tab 1    sertraline (Zoloft) 100 mg tablet Take 1 Tab by mouth daily. 90 Tab 4    amitriptyline (ELAVIL) 50 mg tablet Take 1 Tab by mouth nightly. 90 Tab 4    ibuprofen (MOTRIN) 800 mg tablet Take 1 Tab by mouth every eight (8) hours as needed for Pain. 30 Tab 0    Blood-Glucose Meter monitoring kit Use to check blood sugar 1 time(s) a day and as needed. Please dispense any reliable brand glucometer covered by patients insurance. 1 Kit 0    levothyroxine (SYNTHROID) 100 mcg tablet Take  by mouth Daily (before breakfast).  ascorbic acid (VITAMIN C PO) Take  by mouth.  cholecalciferol, vitamin D3, (VITAMIN D3 PO) Take  by mouth.  b complex vitamins tablet Take 1 Tab by mouth daily.  raNITIdine (ZANTAC) 150 mg tablet Take 1 Tab by mouth two (2) times a day. (Patient taking differently: Take 300 mg by mouth two (2) times a day.) 180 Tab 4    progesterone (PROMETRIUM) 200 mg capsule Take 450 mg by mouth daily.  OTHER,NON-FORMULARY, Indications: testosterone and estradio pellets subQ      [DISCONTINUED] nystatin (MYCOSTATIN) powder Apply  to affected area two (2) times a day. 60 g 0    MAGNESIUM CITRATE PO Take  by mouth.  triamcinolone acetonide (KENALOG) 0.1 % topical cream Apply  to affected area two (2) times a day. use thin layer 453.6 g 12     No current facility-administered medications on file prior to visit.       Health Maintenance Due   Topic Date Due    Shingrix Vaccine Age 49> (1 of 2) 09/02/2008    Foot Exam Q1  09/25/2018    MICROALBUMIN Q1  03/15/2019    Flu Vaccine (1) 09/01/2020    Eye Exam Retinal or Dilated  10/24/2020    PAP AKA CERVICAL CYTOLOGY  04/25/2021         Objective     Visit Vitals  BP (!) 140/80   Pulse 96   Temp 97.9 °F (36.6 °C)   Resp 20   Wt 197 lb 3.2 oz (89.4 kg)   SpO2 99% BMI 29.98 kg/m²     No LMP recorded. (Menstrual status: Menopause). Physical Exam  Constitutional:       General: She is not in acute distress. Appearance: Normal appearance. She is well-developed. She is not toxic-appearing or diaphoretic. Eyes:      Conjunctiva/sclera: Conjunctivae normal.      Pupils: Pupils are equal, round, and reactive to light. Neck:      Vascular: No carotid bruit. Cardiovascular:      Rate and Rhythm: Normal rate and regular rhythm. Pulses: Normal pulses. Heart sounds: Normal heart sounds. No murmur. Pulmonary:      Effort: Pulmonary effort is normal. No respiratory distress. Breath sounds: Normal breath sounds. Abdominal:      General: Bowel sounds are normal. There is no distension. Palpations: Abdomen is soft. There is no mass. Tenderness: There is abdominal tenderness in the right lower quadrant. There is no right CVA tenderness, left CVA tenderness, guarding or rebound. Skin:     General: Skin is warm and dry. Findings: No rash. Neurological:      Mental Status: She is alert and oriented to person, place, and time. LABS   Component      Latest Ref Rng & Units 1/4/2021          12:40 PM   Color (UA POC)       Yellow   Clarity (UA POC)       Cloudy   Glucose (UA POC)      Negative 2+   Bilirubin (UA POC)      Negative Negative   Ketones (UA POC)      Negative Trace   Specific gravity (UA POC)      1.001 - 1.035 1.025   Blood (UA POC)      Negative Negative   pH (UA POC)      4.6 - 8.0 5.0   Protein (UA POC)      Negative Negative   Urobilinogen (UA POC)      0.2 - 1 1 mg/dL   Nitrites (UA POC)      Negative Negative   Leukocyte esterase (UA POC)      Negative Trace     TESTS      Assessment and Plan     1. Controlled type 2 diabetes mellitus with stage 1 chronic kidney disease, without long-term current use of insulin (HCC)  Continue metformin. Add glipizide to be taken before meals.  Continue checking glucose, add another check on days that she works night shift after waking up in the evening and before the first meal of the day. Goal  fasting.  - glipiZIDE (GLUCOTROL) 5 mg tablet; Take 1 Tab by mouth two (2) times a day. Dispense: 60 Tab; Refill: 2    2. Dysuria  UA showed possible UTI. Will send for culture to confirm.  - CULTURE, URINE; Future    3. Genital candidiasis  Continue nystatin. Start another course of diflucan, 3 doses this time. - nystatin (MYCOSTATIN) powder; Apply  to affected area two (2) times a day. Dispense: 60 g; Refill: 0    4. Urine frequency  - AMB POC URINALYSIS DIP STICK AUTO W/O MICRO      Follow-up and Dispositions    · Return in about 4 weeks (around 2/1/2021) for Diabetes follow up, in person. Risks, benefits, and alternatives of the medications and treatment plan prescribed today were discussed, and patient expressed understanding. Printed after visit summary was given to patient and reviewed. All patient questions and concerns were addressed. Plan follow-up as discussed or as needed if any worsening symptoms or change in condition.            Signed electronically by Merlinda Sato, DNP, FNP-BC

## 2021-01-04 NOTE — PROGRESS NOTES
Adrian Ortiz presents today for   Chief Complaint   Patient presents with    Follow-up    Diabetes       Is someone accompanying this pt? no    Is the patient using any DME equipment during [address]? no    Depression Screening:  3 most recent PHQ Screens 3/15/2016   Little interest or pleasure in doing things Not at all   Feeling down, depressed, irritable, or hopeless Not at all   Total Score PHQ 2 0       Learning Assessment:  Learning Assessment 2/16/2015   PRIMARY LEARNER Patient   HIGHEST LEVEL OF EDUCATION - PRIMARY LEARNER  SOME COLLEGE   BARRIERS PRIMARY LEARNER COGNITIVE   CO-LEARNER CAREGIVER No   PRIMARY LANGUAGE ENGLISH   LEARNER PREFERENCE PRIMARY DEMONSTRATION       Abuse Screening:  Abuse Screening Questionnaire 2/16/2015   Do you ever feel afraid of your partner? N   Are you in a relationship with someone who physically or mentally threatens you? N   Is it safe for you to go home? Y       Fall Risk  Fall Risk Assessment, last 12 mths 3/15/2016   Able to walk? Yes   Fall in past 12 months? No       Health Maintenance reviewed and discussed and ordered per Provider. Health Maintenance Due   Topic Date Due    Shingrix Vaccine Age 49> (1 of 2) 09/02/2008    Foot Exam Q1  09/25/2018    MICROALBUMIN Q1  03/15/2019    Flu Vaccine (1) 09/01/2020    Eye Exam Retinal or Dilated  10/24/2020    PAP AKA CERVICAL CYTOLOGY  04/25/2021   . Coordination of Care:  1. Have you been to the ER, urgent care clinic since your last visit? Hospitalized since your last visit? no    2. Have you seen or consulted any other health care providers outside of the [address] [place] [address] since your last visit? Include any pap smears or colon screening.  no      Last  Checked na  Last UDS Checked na  Last Pain contract signed: na

## 2021-01-04 NOTE — PATIENT INSTRUCTIONS
Type 2 Diabetes: Care Instructions  Your Care Instructions     Type 2 diabetes is a disease that develops when the body's tissues cannot use insulin properly. Over time, the pancreas cannot make enough insulin. Insulin is a hormone that helps the body's cells use sugar (glucose) for energy. It also helps the body store extra sugar in muscle, fat, and liver cells. Without insulin, the sugar cannot get into the cells to do its work. It stays in the blood instead. This can cause high blood sugar levels. A person has diabetes when the blood sugar stays too high too much of the time. Over time, diabetes can lead to diseases of the heart, blood vessels, nerves, kidneys, and eyes. You may be able to control your blood sugar by losing weight, eating a healthy diet, and getting daily exercise. You may also have to take insulin or other diabetes medicine. Follow-up care is a key part of your treatment and safety. Be sure to make and go to all appointments. Call your doctor if you are having problems. It's also a good idea to know your test results and keep a list of the medicines you take. How can you care for yourself at home? · Keep your blood sugar at a target level (which you set with your doctor). ? Eat a good diet that spreads carbohydrate throughout the day. Carbohydrate--the body's main source of fuel--affects blood sugar more than any other nutrient. Carbohydrate is in fruits, vegetables, milk, and yogurt. It also is in breads, cereals, vegetables such as potatoes and corn, and sugary foods such as candy and cakes. ? Aim for 30 minutes of exercise on most, preferably all, days of the week. Walking is a good choice. You also may want to do other activities, such as running, swimming, cycling, or playing tennis or team sports. If your doctor says it's okay, do muscle-strengthening exercises at least 2 times a week. ? Take your medicines exactly as prescribed.  Call your doctor if you think you are having a problem with your medicine. You will get more details on the specific medicines your doctor prescribes. · Check your blood sugar as often as your doctor recommends. It is important to keep track of any symptoms you have, such as low blood sugar. Also tell your doctor if you have any changes in your activities, diet, or insulin use. · Talk to your doctor before you start taking aspirin every day. Aspirin can help certain people lower their risk of a heart attack or stroke. But taking aspirin isn't right for everyone, because it can cause serious bleeding. · Do not smoke. If you need help quitting, talk to your doctor about stop-smoking programs and medicines. These can increase your chances of quitting for good. · Keep your cholesterol and blood pressure at normal levels. You may need to take one or more medicines to reach your goals. Take them exactly as directed. Do not stop or change a medicine without talking to your doctor first.  When should you call for help? Call 911 anytime you think you may need emergency care. For example, call if:    · You passed out (lost consciousness), or you suddenly become very sleepy or confused. (You may have very low blood sugar.)   Call your doctor now or seek immediate medical care if:    · Your blood sugar is 300 mg/dL or is higher than the level your doctor has set for you.     · You have symptoms of low blood sugar, such as:  ? Sweating. ? Feeling nervous, shaky, and weak. ? Extreme hunger and slight nausea. ? Dizziness and headache.  ? Blurred vision. ? Confusion. Watch closely for changes in your health, and be sure to contact your doctor if:    · You often have problems controlling your blood sugar.     · You have symptoms of long-term diabetes problems, such as:  ? New vision changes. ? New pain, numbness, or tingling in your hands or feet. ? Skin problems. Where can you learn more?   Go to http://www.gray.com/  Enter C553 in the search box to learn more about \"Type 2 Diabetes: Care Instructions. \"  Current as of: December 20, 2019               Content Version: 12.6  © 3700-1352 [employer]. Care instructions adapted under license by [employer] (which disclaims liability or warranty for this information). If you have questions about a medical condition or this instruction, always ask your healthcare professional. [address] any warranty or liability for your use of this information.

## 2021-01-08 ENCOUNTER — TELEPHONE (OUTPATIENT)
Dept: FAMILY MEDICINE CLINIC | Age: 61
End: 2021-01-08

## 2021-01-08 NOTE — TELEPHONE ENCOUNTER
Called patient left message to return call . She will need to come in to lab for urine culture, repeat .

## 2021-01-11 ENCOUNTER — TELEPHONE (OUTPATIENT)
Dept: FAMILY MEDICINE CLINIC | Age: 61
End: 2021-01-11

## 2021-01-11 ENCOUNTER — HOSPITAL ENCOUNTER (OUTPATIENT)
Dept: LAB | Age: 61
Discharge: HOME | End: 2021-01-11

## 2021-01-11 ENCOUNTER — APPOINTMENT (OUTPATIENT)
Dept: FAMILY MEDICINE CLINIC | Age: 61
End: 2021-01-11

## 2021-01-11 DIAGNOSIS — R35.0 URINE FREQUENCY: Primary | ICD-10-CM

## 2021-01-11 LAB — XX-LABCORP SPECIMEN COL,LCBCF: NORMAL

## 2021-01-11 PROCEDURE — 99001 SPECIMEN HANDLING PT-LAB: CPT

## 2021-01-11 NOTE — TELEPHONE ENCOUNTER
Called to check on patient and make sure she knew to come into lab for urine. States she is in parking lot and going to lab.

## 2021-01-11 NOTE — TELEPHONE ENCOUNTER
Please check on her. Is she still having urinary frequency? Other symptoms? Is she aware that we need to recollect the UA and urine culture?

## 2021-01-13 ENCOUNTER — TELEPHONE (OUTPATIENT)
Dept: FAMILY MEDICINE CLINIC | Age: 61
End: 2021-01-13

## 2021-01-13 LAB
APPEARANCE UR: ABNORMAL
BACTERIA #/AREA URNS HPF: ABNORMAL /[HPF]
BACTERIA UR CULT: NORMAL
BILIRUB UR QL STRIP: NEGATIVE
CASTS URNS MICRO: ABNORMAL
CASTS URNS QL MICRO: PRESENT /LPF
COLOR UR: YELLOW
CRYSTALS URNS MICRO: ABNORMAL
EPI CELLS #/AREA URNS HPF: ABNORMAL /HPF (ref 0–10)
GLUCOSE UR QL: NEGATIVE
HGB UR QL STRIP: NEGATIVE
KETONES UR QL STRIP: ABNORMAL
LEUKOCYTE ESTERASE UR QL STRIP: ABNORMAL
MICRO URNS: ABNORMAL
MUCOUS THREADS URNS QL MICRO: PRESENT
NITRITE UR QL STRIP: NEGATIVE
PH UR STRIP: 5 [PH] (ref 5–7.5)
PROT UR QL STRIP: ABNORMAL
RBC #/AREA URNS HPF: ABNORMAL /HPF (ref 0–2)
SP GR UR: 1.02 (ref 1–1.03)
UNIDENT CRYS URNS QL MICRO: PRESENT
UROBILINOGEN UR STRIP-MCNC: 1 MG/DL (ref 0.2–1)
WBC #/AREA URNS HPF: ABNORMAL /HPF (ref 0–5)

## 2021-01-18 DIAGNOSIS — N18.1 CONTROLLED TYPE 2 DIABETES MELLITUS WITH STAGE 1 CHRONIC KIDNEY DISEASE, WITHOUT LONG-TERM CURRENT USE OF INSULIN (HCC): ICD-10-CM

## 2021-01-18 DIAGNOSIS — E11.22 CONTROLLED TYPE 2 DIABETES MELLITUS WITH STAGE 1 CHRONIC KIDNEY DISEASE, WITHOUT LONG-TERM CURRENT USE OF INSULIN (HCC): ICD-10-CM

## 2021-01-18 RX ORDER — METFORMIN HYDROCHLORIDE 1000 MG/1
1000 TABLET ORAL 2 TIMES DAILY WITH MEALS
Qty: 60 TAB | Refills: 2 | Status: SHIPPED | OUTPATIENT
Start: 2021-01-18 | End: 2021-07-01 | Stop reason: SDUPTHER

## 2021-01-18 NOTE — TELEPHONE ENCOUNTER
Requested Prescriptions     Pending Prescriptions Disp Refills    metFORMIN (GLUCOPHAGE) 1,000 mg tablet 60 Tab 2     Sig: Take 1 Tab by mouth two (2) times daily (with meals).

## 2021-02-01 ENCOUNTER — OFFICE VISIT (OUTPATIENT)
Dept: FAMILY MEDICINE CLINIC | Age: 61
End: 2021-02-01
Payer: COMMERCIAL

## 2021-02-01 VITALS
OXYGEN SATURATION: 97 % | DIASTOLIC BLOOD PRESSURE: 77 MMHG | SYSTOLIC BLOOD PRESSURE: 154 MMHG | RESPIRATION RATE: 20 BRPM | BODY MASS INDEX: 30.56 KG/M2 | WEIGHT: 201 LBS | HEART RATE: 98 BPM | TEMPERATURE: 98 F

## 2021-02-01 DIAGNOSIS — E11.22 CONTROLLED TYPE 2 DIABETES MELLITUS WITH STAGE 1 CHRONIC KIDNEY DISEASE, WITHOUT LONG-TERM CURRENT USE OF INSULIN (HCC): Primary | ICD-10-CM

## 2021-02-01 DIAGNOSIS — M79.661 RIGHT CALF PAIN: ICD-10-CM

## 2021-02-01 DIAGNOSIS — N18.1 CONTROLLED TYPE 2 DIABETES MELLITUS WITH STAGE 1 CHRONIC KIDNEY DISEASE, WITHOUT LONG-TERM CURRENT USE OF INSULIN (HCC): Primary | ICD-10-CM

## 2021-02-01 DIAGNOSIS — N30.00 ACUTE CYSTITIS WITHOUT HEMATURIA: ICD-10-CM

## 2021-02-01 PROCEDURE — 99214 OFFICE O/P EST MOD 30 MIN: CPT | Performed by: NURSE PRACTITIONER

## 2021-02-01 RX ORDER — CEFUROXIME AXETIL 500 MG/1
500 TABLET ORAL 2 TIMES DAILY
Qty: 14 TAB | Refills: 0 | Status: SHIPPED | OUTPATIENT
Start: 2021-02-01 | End: 2021-02-08

## 2021-02-01 NOTE — PATIENT INSTRUCTIONS
Urinary Tract Infection in Women: Care Instructions Your Care Instructions A urinary tract infection, or UTI, is a general term for an infection anywhere between the kidneys and the urethra (where urine comes out). Most UTIs are bladder infections. They often cause pain or burning when you urinate. UTIs are caused by bacteria and can be cured with antibiotics. Be sure to complete your treatment so that the infection goes away. Follow-up care is a key part of your treatment and safety. Be sure to make and go to all appointments, and call your doctor if you are having problems. It's also a good idea to know your test results and keep a list of the medicines you take. How can you care for yourself at home? · Take your antibiotics as directed. Do not stop taking them just because you feel better. You need to take the full course of antibiotics. · Drink extra water and other fluids for the next day or two. This may help wash out the bacteria that are causing the infection. (If you have kidney, heart, or liver disease and have to limit fluids, talk with your doctor before you increase your fluid intake.) · Avoid drinks that are carbonated or have caffeine. They can irritate the bladder. · Urinate often. Try to empty your bladder each time. · To relieve pain, take a hot bath or lay a heating pad set on low over your lower belly or genital area. Never go to sleep with a heating pad in place. To prevent UTIs · Drink plenty of water each day. This helps you urinate often, which clears bacteria from your system. (If you have kidney, heart, or liver disease and have to limit fluids, talk with your doctor before you increase your fluid intake.) · Urinate when you need to. · Urinate right after you have sex. · Change sanitary pads often. · Avoid douches, bubble baths, feminine hygiene sprays, and other feminine hygiene products that have deodorants. · After going to the bathroom, wipe from front to back. When should you call for help? Call your doctor now or seek immediate medical care if: 
  · Symptoms such as fever, chills, nausea, or vomiting get worse or appear for the first time.  
  · You have new pain in your back just below your rib cage. This is called flank pain.  
  · There is new blood or pus in your urine.  
  · You have any problems with your antibiotic medicine. Watch closely for changes in your health, and be sure to contact your doctor if: 
  · You are not getting better after taking an antibiotic for 2 days.  
  · Your symptoms go away but then come back. Where can you learn more? Go to http://www.gray.com/ Enter C069 in the search box to learn more about \"Urinary Tract Infection in Women: Care Instructions. \" Current as of: June 29, 2020               Content Version: 12.6 © 7801-4855 [employer]. Care instructions adapted under license by [employer] (which disclaims liability or warranty for this information). If you have questions about a medical condition or this instruction, always ask your healthcare professional. [address] any warranty or liability for your use of this information.

## 2021-02-01 NOTE — PROGRESS NOTES
Subjective     Patient ID:  Andrew Lizarraga is a 56 y.o. ( 1960) female who presents for the following: Follow-up      HPI     Right calf pain and swelling: Onset was months ago, now getting worse. The swelling is intermittent. Located on the medial aspect of the calf. She thought it was related to her knee arthritis. Urinary symptoms:   Continues with frequency, lower back pain, right pelvic pain, nausea, dysuria. Has been taking urostat which helps with burning. The urine culture done on 21 was negative. She had taken urostat that day. Diabetes, type 2 follow up:  Taking medications as prescribed: Yes added glipizide on 21. Her recent glucose readings have been mostly 90-140s. Following diabetic diet: somewhat  Checking blood sugar: Yes  Symptoms of hyperglycemia: none  Symptoms of hypoglycemia: none      Review of Systems   Constitutional: Negative for appetite change, chills, diaphoresis, fatigue, fever and unexpected weight change. Eyes: Negative for visual disturbance. Respiratory: Negative for cough, chest tightness and shortness of breath. Cardiovascular: Positive for leg swelling. Negative for chest pain and palpitations. Gastrointestinal: Positive for nausea. Negative for abdominal distention, abdominal pain, blood in stool, constipation, diarrhea, rectal pain and vomiting. Endocrine: Negative for polydipsia, polyphagia and polyuria. Genitourinary: Positive for difficulty urinating, dysuria, frequency and pelvic pain. Negative for decreased urine volume, flank pain, genital sores, hematuria, menstrual problem, urgency, vaginal bleeding, vaginal discharge and vaginal pain. Musculoskeletal: Positive for back pain. Negative for joint swelling and myalgias. Skin: Negative for rash and wound. Neurological: Negative for dizziness, weakness, light-headedness, numbness and headaches. Psychiatric/Behavioral: Negative for dysphoric mood and sleep disturbance.  The patient is not nervous/anxious. Past Medical History, Past Surgery History, Allergies, Social History, and Family History were reviewed and updated. Patient Active Problem List   Diagnosis Code    Pure hypercholesterolemia E78.00    Diabetes mellitus type 2, controlled ([facility] [address] E11.9    IBS (irritable bowel syndrome) K58.9    Depression F32.9    Type 2 diabetes with nephropathy ([facility] [address] E11.21    History of hepatitis C Z86.19     Past Medical History:   Diagnosis Date    Anxiety and depression     Arthritis     Depression     Depression 2/16/2015    Endometriosis     History of hepatitis C 10/24/2018    Menopause     Pure hypercholesterolemia 2/16/2015    Sinus congestion      Patient Care Team:  Papa Juarez NP as PCP - General (Nurse Practitioner)  Papa Juarez NP as PCP - [facility] Empaneled Provider  Alize Rascon MD (Dermatology)    Past Surgical History:   Procedure Laterality Date    HX CHOLECYSTECTOMY      HX CHOLECYSTECTOMY      HX CYST INCISION AND DRAINAGE      rt 2010???? Family History   Problem Relation Age of Onset    Heart Disease Mother     Osteoporosis Mother     Hypertension Father     Heart Disease Father     Diabetes Father     Cancer Paternal Grandfather     Breast Cancer Other         cousin and niece     Social History     Tobacco Use    Smoking status: Former Smoker    Smokeless tobacco: Never Used   Substance Use Topics    Alcohol use: Yes     Alcohol/week: 2.5 standard drinks     Types: 3 drink(s) per week    Drug use: No     Allergies   Allergen Reactions    Aspirin Other (comments)     Bleeding       Current Outpatient Medications on File Prior to Visit   Medication Sig Dispense Refill    metFORMIN (GLUCOPHAGE) 1,000 mg tablet Take 1 Tab by mouth two (2) times daily (with meals). 60 Tab 2    glipiZIDE (GLUCOTROL) 5 mg tablet Take 1 Tab by mouth two (2) times a day.  60 Tab 2    nystatin (MYCOSTATIN) powder Apply  to affected area two (2) times a day. 60 g 0    atorvastatin (LIPITOR) 10 mg tablet Take 1 Tab by mouth nightly. 30 Tab 2    glucose blood VI test strips (ASCENSIA AUTODISC VI, ONE TOUCH ULTRA TEST VI) strip Onetouch Ultra2 to fit pt's glucometer test twice a day  Dx E11.21 200 Strip 11    lancets misc Use to check blood sugar 2 time(s) a day and as needed. Please dispense brand compatible with patient's equipment. 200 Each 11    sertraline (ZOLOFT) 50 mg tablet Take 1 Tab by mouth daily. Take with 100mg tab for total of 150mg daily 90 Tab 1    sertraline (Zoloft) 100 mg tablet Take 1 Tab by mouth daily. 90 Tab 4    amitriptyline (ELAVIL) 50 mg tablet Take 1 Tab by mouth nightly. 90 Tab 4    ibuprofen (MOTRIN) 800 mg tablet Take 1 Tab by mouth every eight (8) hours as needed for Pain. 30 Tab 0    Blood-Glucose Meter monitoring kit Use to check blood sugar 1 time(s) a day and as needed. Please dispense any reliable brand glucometer covered by patients insurance. 1 Kit 0    levothyroxine (SYNTHROID) 100 mcg tablet Take  by mouth Daily (before breakfast).  ascorbic acid (VITAMIN C PO) Take  by mouth.  cholecalciferol, vitamin D3, (VITAMIN D3 PO) Take  by mouth.  MAGNESIUM CITRATE PO Take  by mouth.  b complex vitamins tablet Take 1 Tab by mouth daily.  triamcinolone acetonide (KENALOG) 0.1 % topical cream Apply  to affected area two (2) times a day. use thin layer 453.6 g 12    raNITIdine (ZANTAC) 150 mg tablet Take 1 Tab by mouth two (2) times a day. (Patient taking differently: Take 300 mg by mouth two (2) times a day.) 180 Tab 4    progesterone (PROMETRIUM) 200 mg capsule Take 450 mg by mouth daily.  OTHER,NON-FORMULARY, Indications: testosterone and estradio pellets subQ       No current facility-administered medications on file prior to visit.       Health Maintenance Due   Topic Date Due    COVID-19 Vaccine (1 of 2) 09/02/1974    Shingrix Vaccine Age 50> (1 of 2) 09/02/2008    Foot Exam Q1 09/25/2018    MICROALBUMIN Q1  03/15/2019    Flu Vaccine (1) 09/01/2020    Eye Exam Retinal or Dilated  10/24/2020    PAP AKA CERVICAL CYTOLOGY  04/25/2021         Objective     Visit Vitals  BP (!) 154/77   Pulse 98   Temp 98 °F (36.7 °C)   Resp 20   Wt 201 lb (91.2 kg)   SpO2 97%   BMI 30.56 kg/m²     No LMP recorded. (Menstrual status: Menopause). Physical Exam  Constitutional:       General: She is not in acute distress. Appearance: Normal appearance. She is well-developed. She is not toxic-appearing or diaphoretic. Eyes:      Conjunctiva/sclera: Conjunctivae normal.      Pupils: Pupils are equal, round, and reactive to light. Neck:      Vascular: No carotid bruit. Cardiovascular:      Rate and Rhythm: Normal rate and regular rhythm. Pulses: Normal pulses. Heart sounds: Normal heart sounds. No murmur. Pulmonary:      Effort: Pulmonary effort is normal. No respiratory distress. Breath sounds: Normal breath sounds. Abdominal:      General: Bowel sounds are normal. There is no distension. Palpations: Abdomen is soft. There is no mass. Tenderness: There is no abdominal tenderness. There is right CVA tenderness and left CVA tenderness. There is no guarding or rebound. Musculoskeletal:      Right lower leg: She exhibits tenderness (area of soft tissue tenderness on the medial aspect of the calf, without mass, erythema, or swelling). She exhibits no bony tenderness, no swelling, no deformity and no laceration. No edema. Skin:     General: Skin is warm and dry. Findings: No rash. Neurological:      Mental Status: She is alert and oriented to person, place, and time. LABS     TESTS      Assessment and Plan     1. Controlled type 2 diabetes mellitus with stage 1 chronic kidney disease, without long-term current use of insulin (HCC)  Glucose control has improved. Continue current regimen. 2. Acute cystitis without hematuria  Start abt.  Follow up as needed if symptoms continue. - cefUROXime (CEFTIN) 500 mg tablet; Take 1 Tab by mouth two (2) times a day for 7 days. Dispense: 14 Tab; Refill: 0    3. Right calf pain  US to r/o DVT. Further plan after result. - DUPLEX LOWER EXT VENOUS RIGHT; Future      Follow-up and Dispositions    · Return in about 6 weeks (around 3/15/2021) for Diabetes follow up EOV, in person. Risks, benefits, and alternatives of the medications and treatment plan prescribed today were discussed, and patient expressed understanding. Printed after visit summary was given to patient and reviewed. All patient questions and concerns were addressed. Plan follow-up as discussed or as needed if any worsening symptoms or change in condition.            Signed electronically by Beatriz Rothman, APOLONIA, AMRIT-BC

## 2021-02-01 NOTE — PROGRESS NOTES
Jesica Iyer presents today for   Chief Complaint   Patient presents with   • Follow-up       Is someone accompanying this pt? no    Is the patient using any DME equipment during OV? no    Depression Screening:  3 most recent PHQ Screens 3/15/2016   Little interest or pleasure in doing things Not at all   Feeling down, depressed, irritable, or hopeless Not at all   Total Score PHQ 2 0       Learning Assessment:  Learning Assessment 2/16/2015   PRIMARY LEARNER Patient   HIGHEST LEVEL OF EDUCATION - PRIMARY LEARNER  SOME COLLEGE   BARRIERS PRIMARY LEARNER COGNITIVE   CO-LEARNER CAREGIVER No   PRIMARY LANGUAGE ENGLISH   LEARNER PREFERENCE PRIMARY DEMONSTRATION       Abuse Screening:  Abuse Screening Questionnaire 2/16/2015   Do you ever feel afraid of your partner? N   Are you in a relationship with someone who physically or mentally threatens you? N   Is it safe for you to go home? Y       Fall Risk  Fall Risk Assessment, last 12 mths 3/15/2016   Able to walk? Yes   Fall in past 12 months? No       Health Maintenance reviewed and discussed and ordered per Provider.    Health Maintenance Due   Topic Date Due   • COVID-19 Vaccine (1 of 2) 09/02/1974   • Shingrix Vaccine Age 50> (1 of 2) 09/02/2008   • Foot Exam Q1  09/25/2018   • MICROALBUMIN Q1  03/15/2019   • Flu Vaccine (1) 09/01/2020   • Eye Exam Retinal or Dilated  10/24/2020   • PAP AKA CERVICAL CYTOLOGY  04/25/2021   .      Coordination of Care:  1. Have you been to the ER, urgent care clinic since your last visit? Hospitalized since your last visit? no    2. Have you seen or consulted any other health care providers outside of the [facility] since your last visit? Include any pap smears or colon screening. n      Last  Checked na  Last UDS Checked na  Last Pain contract signed: na

## 2021-02-02 ENCOUNTER — HOSPITAL ENCOUNTER (OUTPATIENT)
Dept: VASCULAR SURGERY | Age: 61
Discharge: HOME | End: 2021-02-02
Attending: NURSE PRACTITIONER
Payer: COMMERCIAL

## 2021-02-02 PROCEDURE — 93971 EXTREMITY STUDY: CPT

## 2021-02-03 DIAGNOSIS — M79.661 RIGHT CALF PAIN: Primary | ICD-10-CM

## 2021-03-16 DIAGNOSIS — Z12.31 ENCOUNTER FOR SCREENING MAMMOGRAM FOR MALIGNANT NEOPLASM OF BREAST: Primary | ICD-10-CM

## 2021-03-16 DIAGNOSIS — Z12.31 ENCOUNTER FOR SCREENING MAMMOGRAM FOR MALIGNANT NEOPLASM OF BREAST: ICD-10-CM

## 2021-03-18 ENCOUNTER — HOSPITAL ENCOUNTER (OUTPATIENT)
Dept: MAMMOGRAPHY | Age: 61
Discharge: HOME | End: 2021-03-18
Payer: COMMERCIAL

## 2021-03-18 DIAGNOSIS — Z12.31 VISIT FOR SCREENING MAMMOGRAM: ICD-10-CM

## 2021-03-18 PROCEDURE — 77063 BREAST TOMOSYNTHESIS BI: CPT

## 2021-03-19 DIAGNOSIS — N18.1 CONTROLLED TYPE 2 DIABETES MELLITUS WITH STAGE 1 CHRONIC KIDNEY DISEASE, WITHOUT LONG-TERM CURRENT USE OF INSULIN (HCC): ICD-10-CM

## 2021-03-19 DIAGNOSIS — E11.22 CONTROLLED TYPE 2 DIABETES MELLITUS WITH STAGE 1 CHRONIC KIDNEY DISEASE, WITHOUT LONG-TERM CURRENT USE OF INSULIN (HCC): ICD-10-CM

## 2021-03-22 RX ORDER — GLIPIZIDE 5 MG/1
TABLET ORAL
Qty: 60 TAB | Refills: 1 | Status: SHIPPED | OUTPATIENT
Start: 2021-03-22 | End: 2021-05-14 | Stop reason: SDUPTHER

## 2021-03-27 NOTE — PROGRESS NOTES
Joelle Fulton is a 59 y.o.  female and presents with Chief Complaint Patient presents with  Diabetes  Cholesterol Problem  Irritable Bowel Syndrome  Depression Subjective: 
 
Cardiovascular Review: 
The patient has diabetes, hypertension and hyperlipidemia. Diet and Lifestyle: not attempting to follow a low fat, low cholesterol diet Home BP Monitoring: is not measured at home. Pertinent ROS: taking medications as instructed, no medication side effects noted, no TIA's, no chest pain on exertion, no dyspnea on exertion, no swelling of ankles. Diabetes Mellitus: 
She has diabetes mellitus, and  diabetes, hypertension and hyperlipidemia. Diabetic ROS - medication compliance: compliant all of the time. Lab review: labs are reviewed, up to date and normal.  
Depression Review: 
Patient is seen for followup of depression. Treatment includes Zoloft, Xanax and no other therapies. Ongoing symptoms include depressed mood. She denies depressed mood. She experiences the following side effects from the treatment: none. Additional Concerns: ibs  ongoing Patient Active Problem List  
 Diagnosis Date Noted  History of hepatitis C 10/24/2018  Type 2 diabetes with nephropathy ([facility] [address] 04/26/2018  Pure hypercholesterolemia 02/16/2015  Diabetes mellitus type 2, controlled ([facility] [address] 02/16/2015  IBS (irritable bowel syndrome) 02/16/2015  Depression 02/16/2015 Current Outpatient Medications Medication Sig Dispense Refill  triamcinolone acetonide (KENALOG) 0.1 % topical cream Apply  to affected area two (2) times a day. use thin layer 453.6 g 12  
 amitriptyline (ELAVIL) 50 mg tablet Take 1 Tab by mouth nightly. 90 Tab 4  
 sertraline (ZOLOFT) 100 mg tablet Take 1 Tab by mouth daily. 90 Tab 4  
 raNITIdine (ZANTAC) 150 mg tablet Take 1 Tab by mouth two (2) times a day. 180 Tab 4  thyroid, Pork, (ARMOUR) 60 mg tablet Take 90 mg by mouth daily.  progesterone (PROMETRIUM) 200 mg capsule Take 450 mg by mouth daily.  OTHER,NON-FORMULARY, Indications: testosterone and estradio pellets subQ Allergies Allergen Reactions  Aspirin Other (comments) Bleeding Past Medical History:  
Diagnosis Date  Anxiety and depression  Arthritis  Depression  Depression 2/16/2015  Endometriosis  History of hepatitis C 10/24/2018  Pure hypercholesterolemia 2/16/2015  Sinus congestion Past Surgical History:  
Procedure Laterality Date  HX CHOLECYSTECTOMY  HX CHOLECYSTECTOMY  HX CYST INCISION AND DRAINAGE    
 rt 2010???? Family History Problem Relation Age of Onset  Heart Disease Mother  Hypertension Father  Heart Disease Father  Diabetes Father  Cancer Paternal Grandfather  Breast Cancer Other   
     cousin and niece Social History Tobacco Use  Smoking status: Former Smoker  Smokeless tobacco: Never Used Substance Use Topics  Alcohol use: Yes Alcohol/week: 1.5 oz Types: 3 drink(s) per week ROS All other systems reviewed and are negative. Objective: 
Vitals:  
 04/08/19 1417 BP: 97/57 Pulse: 70 Resp: 18 Temp: 97.6 °F (36.4 °C) TempSrc: Oral  
SpO2: 98% Weight: 178 lb 12.8 oz (81.1 kg) Height: 5' 8\" (1.727 m) PainSc:   0 - No pain  
 
 
 
 
 
 
 
alert, well appearing, and in no distress, oriented to person, place, and time and normal appearing weight Chest - clear to auscultation, no wheezes, rales or rhonchi, symmetric air entry Heart - normal rate, regular rhythm, normal S1, S2, no murmurs, rubs, clicks or gallops Abdomen - soft, nontender, nondistended, no masses or organomegaly LABS Component Latest Ref Rng & Units 4/1/2019 4/1/2019 4/1/2019 4/1/2019  
 
     12:00 PM 12:00 PM 12:00 PM 12:00 PM  
Glucose 70 - 99 mg/dL    133 (H) BUN 
    6 - 22 mg/dL    10 Creatinine 0.8 - 1.4 mg/dL    0.8 Sodium 133 - 145 mmol/L    142 Potassium 3.5 - 5.5 mmol/L    4.6 Chloride 98 - 110 mmol/L    103 CO2 
    20 - 32 mmol/L    23 AST 
    10 - 37 U/L    13  
ALT (SGPT) 5 - 40 U/L    16 Alk. phosphatase 40 - 120 U/L    86 Bilirubin, total 
    0.2 - 1.2 mg/dL    0.5 Calcium 8.4 - 10.5 mg/dL    9.1 Protein, total 
    6.2 - 8.1 g/dL    6.6 Albumin 3.5 - 5.0 g/dL    4.5 A-G Ratio 1.1 - 2.6 ratio    2.1 Globulin 2.0 - 4.0 g/dL    2.1 Anion gap 
    mmol/L    16.0 GFRAA 
    >60.0    >60.0 GFRNA 
    >60.0    >60.0 Triglyceride 40 - 149 mg/dL HDL Cholesterol 40 - 59 mg/dL Cholesterol, total 
    110 - 200 mg/dL CHOLESTEROL/HDL 
    0.0 - 5.0 LDL, calculated 50 - 99 mg/dL VLDL, calculated 8 - 30 mg/dL Hemoglobin A1c, (calculated) 4.8 - 5.9 % 5.8 AVG GLU 
    91 - 123 mg/dL 120     
T4, Free 0.9 - 1.8 ng/dL   0.6 (L) TSH 
    0.27 - 4.20 mcU/mL  0.17 (L) Component Latest Ref Rng & Units 4/1/2019  
 
     12:00 PM  
Glucose 70 - 99 mg/dL BUN 
    6 - 22 mg/dL Creatinine 0.8 - 1.4 mg/dL Sodium 133 - 145 mmol/L Potassium 3.5 - 5.5 mmol/L Chloride 98 - 110 mmol/L   
CO2 
    20 - 32 mmol/L   
AST 
    10 - 37 U/L   
ALT (SGPT) 5 - 40 U/L Alk. phosphatase 40 - 120 U/L Bilirubin, total 
    0.2 - 1.2 mg/dL Calcium 8.4 - 10.5 mg/dL Protein, total 
    6.2 - 8.1 g/dL Albumin 3.5 - 5.0 g/dL A-G Ratio 1.1 - 2.6 ratio Globulin 2.0 - 4.0 g/dL Anion gap 
    mmol/L   
GFRAA 
    >60.0 GFRNA 
    >60.0 Triglyceride 40 - 149 mg/dL 184 (H) HDL Cholesterol 40 - 59 mg/dL 37 (L) Cholesterol, total 
    110 - 200 mg/dL 195 CHOLESTEROL/HDL 
    0.0 - 5.0 5.3 LDL, calculated 50 - 99 mg/dL 122 (H) VLDL, calculated 8 - 30 mg/dL 37 (H) Hemoglobin A1c, (calculated) 4.8 - 5.9 % AVG GLU 
    91 - 123 mg/dL T4, Free 0.9 - 1.8 ng/dL TSH 
    0.27 - 4.20 mcU/mL TESTS Assessment/Plan:   
Diabetes - well controlled, stable, diet only Hyperlipidemia - stable 
ibs  Ongoing Depression stable Lab review: labs are reviewed, up to date and normal 
 
Diagnoses and all orders for this visit: 1. Pure hypercholesterolemia 2. Irritable bowel syndrome without diarrhea 3. Controlled type 2 diabetes mellitus with stage 1 chronic kidney disease, without long-term current use of insulin ([facility] [address] 4. Current mild episode of major [facility] disorder [facility] I have discussed the diagnosis with the patient and the intended plan as seen in the above orders. The patient has received an after-visit summary and questions were answered concerning future plans. I have discussed medication side effects and warnings with the patient as well. I have reviewed the plan of care with the patient, accepted their input and they are in agreement with the treatment goals. Follow-up and Dispositions · Return in about 3 months (around 7/8/2019) for physical. 
  
 
 
 
 
 
 
 
 
 
 
 LACERATION

## 2021-05-19 ENCOUNTER — VIRTUAL VISIT (OUTPATIENT)
Dept: FAMILY MEDICINE CLINIC | Age: 61
End: 2021-05-19
Payer: COMMERCIAL

## 2021-05-19 DIAGNOSIS — N18.1 CONTROLLED TYPE 2 DIABETES MELLITUS WITH STAGE 1 CHRONIC KIDNEY DISEASE, WITHOUT LONG-TERM CURRENT USE OF INSULIN (HCC): ICD-10-CM

## 2021-05-19 DIAGNOSIS — E11.22 CONTROLLED TYPE 2 DIABETES MELLITUS WITH STAGE 1 CHRONIC KIDNEY DISEASE, WITHOUT LONG-TERM CURRENT USE OF INSULIN (HCC): ICD-10-CM

## 2021-05-19 DIAGNOSIS — E78.00 PURE HYPERCHOLESTEROLEMIA: ICD-10-CM

## 2021-05-19 DIAGNOSIS — E11.21 TYPE 2 DIABETES WITH NEPHROPATHY (HCC): Primary | ICD-10-CM

## 2021-05-19 PROCEDURE — 99212 OFFICE O/P EST SF 10 MIN: CPT | Performed by: NURSE PRACTITIONER

## 2021-05-19 RX ORDER — GLIPIZIDE 5 MG/1
TABLET ORAL
Qty: 60 TABLET | Refills: 0 | Status: SHIPPED | OUTPATIENT
Start: 2021-05-19 | End: 2021-07-01 | Stop reason: SDUPTHER

## 2021-05-19 RX ORDER — ATORVASTATIN CALCIUM 10 MG/1
10 TABLET, FILM COATED ORAL
Qty: 30 TABLET | Refills: 0 | Status: SHIPPED | OUTPATIENT
Start: 2021-05-19 | End: 2021-07-01 | Stop reason: SDUPTHER

## 2021-05-19 NOTE — PROGRESS NOTES
HISTORY OF PRESENT ILLNESS  Grecia Esquivel is a 56 y.o. female seen to establish care and diabetes follow-up with refills  Grecia Esquivel, who was evaluated through a synchronous (real-time) audio-video encounter, and/or her healthcare decision maker, is aware that it is a billable service, with coverage as determined by her insurance carrier. She provided verbal consent to proceed: Yes, and patient identification was verified. This visit was conducted pursuant to the emergency declaration under the [address] [place] [address] and the [employer] General Act. A caregiver was present when appropriate. Ability to conduct physical exam was limited. The patient was located in a state where the provider was credentialed to provide care. --Svetlana Zimmer NP on 5/19/2021 at 11:50 AM  HPI  NP [facility] patient. Patient is establishing care with me today. Patient has been off glipizide for some time, months. Patient says she has not been checking her blood glucose because she knows it would be high when she is off the glipizide. Patient says back in May 2020 she was pretty 6 to 8 weeks. She says she was pretty sure she had Covid but they did a rapid test and she was negative. She said she was so sick and they told her she had \"missed shaped platelets. \"    February 2021 patient's A1c was 10.2%. Then December 2020 she was sick again with \"my sugar. \" Patient says when she had Covid her throat hurt really bad, she ate a lot of ice cream and her diabetes got out of control with all the extra calories. She says when she used to see Dr. Cheyenne Ramos, he took her off her diabetes medications because he said she did not have it anymore. Patient reports she struggled with all the medical bills from May of last year and December. She says her company changed their healthcare insurance to something that is not as good.   Advised patient she should come in soon and we do her A1c again in an exam and diabetic foot exam.    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 Patient reports her appetite is good, no urinary issues and regular bowel movements. Patient denies fever, chills, chest pain, shortness of breath, abdomen pain or dark tarry stool. Advised patient when she comes in to be seen face-to-face we need to discuss some her care gaps. Patient appears to be due for a Pap smear, foot exam, urine microalbumin and eye exam.    Review of Systems   Constitutional: Negative for chills, fever, malaise/fatigue and weight loss. HENT: Negative for congestion, ear pain, sinus pain and sore throat. Eyes: Negative. Respiratory: Negative for cough and shortness of breath. Cardiovascular: Negative for chest pain and leg swelling. Gastrointestinal: Negative. Genitourinary: Negative. Musculoskeletal: Negative for back pain, falls, joint pain, myalgias and neck pain. Neurological: Negative. Endo/Heme/Allergies: Negative. Psychiatric/Behavioral: Negative for depression. The patient is not nervous/anxious. There were no vitals taken for this visit. Physical Exam  Constitutional:       General: She is not in acute distress. Appearance: Normal appearance. She is obese. She is not ill-appearing. HENT:      Head: Normocephalic and atraumatic. Nose: Nose normal. No congestion. Eyes:      General:         Right eye: No discharge. Left eye: No discharge. Conjunctiva/sclera: Conjunctivae normal.   Pulmonary:      Effort: Pulmonary effort is normal.   Neurological:      Mental Status: She is alert and oriented to person, place, and time. Psychiatric:         Mood and Affect: Mood normal.         Behavior: Behavior normal.         Thought Content:  Thought content normal.         Judgment: Judgment normal.       Past Medical History: Diagnosis Date    Anxiety and depression     Arthritis     Depression     Depression 2/16/2015    Endometriosis     History of hepatitis C 10/24/2018    Menopause     Pure hypercholesterolemia 2/16/2015    Sinus congestion      Patient Active Problem List   Diagnosis Code    Pure hypercholesterolemia E78.00    Diabetes mellitus type 2, controlled ([facility] E11.9    IBS (irritable bowel syndrome) K58.9    Depression F32.9    [facility] E11.21    History of hepatitis C Z86.19     Current Outpatient Medications on File Prior to Visit   Medication Sig Dispense Refill    [DISCONTINUED] atorvastatin (LIPITOR) 10 mg tablet Take 1 Tab by mouth nightly. 30 Tab 0    [DISCONTINUED] glipiZIDE (GLUCOTROL) 5 mg tablet TAKE 1 TABLET BY MOUTH TWICE A DAY 60 Tab 0    metFORMIN (GLUCOPHAGE) 1,000 mg tablet Take 1 Tab by mouth two (2) times daily (with meals). 60 Tab 2    nystatin (MYCOSTATIN) powder Apply  to affected area two (2) times a day. 60 g 0    glucose blood VI test strips (ASCENSIA AUTODISC VI, ONE TOUCH ULTRA TEST VI) strip Onetouch Ultra2 to fit pt's glucometer test twice a day  Dx E11.21 200 Strip 11    lancets misc Use to check blood sugar 2 time(s) a day and as needed. Please dispense brand compatible with patient's equipment. 200 Each 11    sertraline (ZOLOFT) 50 mg tablet Take 1 Tab by mouth daily. Take with 100mg tab for total of 150mg daily 90 Tab 1    sertraline (Zoloft) 100 mg tablet Take 1 Tab by mouth daily. 90 Tab 4    amitriptyline (ELAVIL) 50 mg tablet Take 1 Tab by mouth nightly. 90 Tab 4    ibuprofen (MOTRIN) 800 mg tablet Take 1 Tab by mouth every eight (8) hours as needed for Pain. 30 Tab 0    Blood-Glucose Meter monitoring kit Use to check blood sugar 1 time(s) a day and as needed. Please dispense any reliable brand glucometer covered by patients insurance. 1 Kit 0    levothyroxine (SYNTHROID) 100 mcg tablet Take  by mouth Daily (before breakfast).  ascorbic acid (VITAMIN C PO) Take  by mouth.  cholecalciferol, vitamin D3, (VITAMIN D3 PO) Take  by mouth.  MAGNESIUM CITRATE PO Take  by mouth.  b complex vitamins tablet Take 1 Tab by mouth daily.  triamcinolone acetonide (KENALOG) 0.1 % topical cream Apply  to affected area two (2) times a day. use thin layer 453.6 g 12    raNITIdine (ZANTAC) 150 mg tablet Take 1 Tab by mouth two (2) times a day. (Patient taking differently: Take 300 mg by mouth two (2) times a day.) 180 Tab 4    progesterone (PROMETRIUM) 200 mg capsule Take 450 mg by mouth daily.  OTHER,NON-FORMULARY, Indications: testosterone and estradio pellets subQ       No current facility-administered medications on file prior to visit. ASSESSMENT and PLAN    ICD-10-CM ICD-9-CM    1. Type 2 diabetes with nephropathy (HCC)  E11.21 250.40 HEMOGLOBIN A1C WITH EAG     583.81 MICROALBUMIN, UR, RAND W/ MICROALB/CREAT RATIO   2. Controlled type 2 diabetes mellitus with stage 1 chronic kidney disease, without long-term current use of insulin (HCC)  E11.22 250.40 glipiZIDE (GLUCOTROL) 5 mg tablet    N18.1 585.1    3. Pure hypercholesterolemia  E78.00 272.0 atorvastatin (LIPITOR) 10 mg tablet     Follow-up and Dispositions    · Return in about 2 weeks (around 6/2/2021), or if symptoms worsen or fail to improve. Routing History      50% of 15 minutes spent on counseling and managing of care. Patient agreed to make a face-to-face appointment for follow-up related to diabetes, diabetic foot exam, A1c and urine microalbumin.

## 2021-05-19 NOTE — Clinical Note
February 2021 patient's A1c was 10.2. I told her we need to recheck her A1c and she should have an exam.  She is due for diabetic foot exam.  Please call her and make her face-to-face exam within the next month.   Thanks

## 2021-06-16 NOTE — PROGRESS NOTES
Contacted patient and left detailed message to contact the office in order to schedule in office follow up appointment. Awaiting callback to schedule.

## 2021-07-01 ENCOUNTER — OFFICE VISIT (OUTPATIENT)
Dept: FAMILY MEDICINE CLINIC | Age: 61
End: 2021-07-01
Payer: COMMERCIAL

## 2021-07-01 ENCOUNTER — HOSPITAL ENCOUNTER (OUTPATIENT)
Dept: LAB | Age: 61
Discharge: HOME | End: 2021-07-01

## 2021-07-01 VITALS
BODY MASS INDEX: 30.34 KG/M2 | TEMPERATURE: 97.2 F | OXYGEN SATURATION: 96 % | SYSTOLIC BLOOD PRESSURE: 125 MMHG | DIASTOLIC BLOOD PRESSURE: 82 MMHG | WEIGHT: 200.2 LBS | HEIGHT: 68 IN | RESPIRATION RATE: 16 BRPM | HEART RATE: 79 BPM

## 2021-07-01 DIAGNOSIS — N18.1 CONTROLLED TYPE 2 DIABETES MELLITUS WITH STAGE 1 CHRONIC KIDNEY DISEASE, WITHOUT LONG-TERM CURRENT USE OF INSULIN (HCC): Primary | ICD-10-CM

## 2021-07-01 DIAGNOSIS — E11.22 CONTROLLED TYPE 2 DIABETES MELLITUS WITH STAGE 1 CHRONIC KIDNEY DISEASE, WITHOUT LONG-TERM CURRENT USE OF INSULIN (HCC): Primary | ICD-10-CM

## 2021-07-01 DIAGNOSIS — N39.0 RECURRENT UTI: ICD-10-CM

## 2021-07-01 DIAGNOSIS — K58.2 IRRITABLE BOWEL SYNDROME WITH BOTH CONSTIPATION AND DIARRHEA: ICD-10-CM

## 2021-07-01 DIAGNOSIS — E78.00 PURE HYPERCHOLESTEROLEMIA: ICD-10-CM

## 2021-07-01 DIAGNOSIS — F41.9 ANXIETY: ICD-10-CM

## 2021-07-01 LAB
HBA1C MFR BLD HPLC: 6.5 %
XX-LABCORP SPECIMEN COL,LCBCF: NORMAL

## 2021-07-01 PROCEDURE — 83036 HEMOGLOBIN GLYCOSYLATED A1C: CPT | Performed by: NURSE PRACTITIONER

## 2021-07-01 PROCEDURE — 99212 OFFICE O/P EST SF 10 MIN: CPT | Performed by: NURSE PRACTITIONER

## 2021-07-01 PROCEDURE — 99001 SPECIMEN HANDLING PT-LAB: CPT

## 2021-07-01 RX ORDER — AMITRIPTYLINE HYDROCHLORIDE 50 MG/1
50 TABLET, FILM COATED ORAL
Qty: 90 TABLET | Refills: 4 | Status: SHIPPED | OUTPATIENT
Start: 2021-07-01 | End: 2022-08-10 | Stop reason: SDUPTHER

## 2021-07-01 RX ORDER — METFORMIN HYDROCHLORIDE 1000 MG/1
1000 TABLET ORAL 2 TIMES DAILY WITH MEALS
Qty: 180 TABLET | Refills: 1 | Status: SHIPPED | OUTPATIENT
Start: 2021-07-01 | End: 2022-01-07 | Stop reason: SDUPTHER

## 2021-07-01 RX ORDER — ATORVASTATIN CALCIUM 10 MG/1
10 TABLET, FILM COATED ORAL
Qty: 90 TABLET | Refills: 3 | Status: SHIPPED | OUTPATIENT
Start: 2021-07-01 | End: 2022-08-10 | Stop reason: SDUPTHER

## 2021-07-01 RX ORDER — SERTRALINE HYDROCHLORIDE 50 MG/1
50 TABLET, FILM COATED ORAL DAILY
Qty: 90 TABLET | Refills: 1 | Status: SHIPPED | OUTPATIENT
Start: 2021-07-01 | End: 2022-08-23 | Stop reason: SDUPTHER

## 2021-07-01 RX ORDER — GLIPIZIDE 5 MG/1
TABLET ORAL
Qty: 180 TABLET | Refills: 1 | Status: SHIPPED | OUTPATIENT
Start: 2021-07-01 | End: 2022-01-07 | Stop reason: SDUPTHER

## 2021-07-01 NOTE — PROGRESS NOTES
OFFICE NOTE    Tye Regan is a 56 y.o. female presenting today for office visit. 7/1/2021  8:45 AM    Chief Complaint   Patient presents with    Follow-up     A1C check       HPI:   In office visit, for A1c follow-up and microalbumin. Patient says she is well and she looks well. Patient says she had a pay cut at work and eats cheaper food. Today A1C 6.5%, great job. Patient reports appetite is good, no urinary issues, sleeps well and regular bowel movements. Patient denies fever, chills, chest pain, shortness of breath, abdomen pain or dark tarry stool. Diabetic foot exam, ordered   Patient appears to need second Covid vaccine, got it. [employer]   Discussed diabetic eye exam , will schedule it. ROS:    · General: negative for - chills, fever, weight changes or malaise  · HEENT: no sore throat, nasal congestion, vision problems or ear problems  · Respiratory: no cough, shortness of breath, or wheezing  · Cardiovascular: no chest pain, palpitations, or dyspnea on exertion  · Gastrointestinal: no abdominal pain, N/V, change in bowel habits, or black or bloody stools  · Musculoskeletal: no back pain, joint pain, joint stiffness, muscle pain or muscle weakness  · Neurological: no numbness, tingling, headache or dizziness  · Endo:  No polyuria or polydipsia. · : no hematuria, dysuria, frequency, hesitancy, or nocturia. · Skin: No itching or rash, no open skin, no unusual lesions   · Psychological: negative for - anxiety, depression, sleep disturbances, suicidal or homicidal ideations     Diabetic Foot Fxam  Feet:  No pedal rashes. Flat feet. No open wounds. Monofilament testing is intact. Vascularly intact.      PHQ Screening   3 most recent PHQ Screens 5/19/2021   Little interest or pleasure in doing things Not at all   Feeling down, depressed, irritable, or hopeless Not at all   Total Score PHQ 2 0       History  Past Medical History:   Diagnosis Date    Anxiety and depression     Arthritis     Depression     Depression 2/16/2015    Endometriosis     History of hepatitis C 10/24/2018    Menopause     Pure hypercholesterolemia 2/16/2015    Sinus congestion        Past Surgical History:   Procedure Laterality Date    HX CHOLECYSTECTOMY      HX CHOLECYSTECTOMY      HX CYST INCISION AND DRAINAGE      rt 2010???? Social History     Socioeconomic History    Marital status:      Spouse name: Not on file    Number of children: Not on file    Years of education: Not on file    Highest education level: Not on file   Occupational History    Not on file   Tobacco Use    Smoking status: Former Smoker    Smokeless tobacco: Never Used   Substance and Sexual Activity    Alcohol use: Yes     Alcohol/week: 2.5 standard drinks     Types: 3 drink(s) per week    Drug use: No    Sexual activity: Yes     Partners: Male   Other Topics Concern    Not on file   Social History Narrative    Not on file     Social Determinants of Health     Financial Resource Strain:     Difficulty of Paying Living Expenses:    Food Insecurity:     Worried About Running Out of Food in the Last Year:     [address] in the Last Year:    Transportation Needs:     Lack of Transportation (Medical):  Lack of Transportation (Non-Medical):    Physical Activity:     Days of Exercise per Week:     Minutes of Exercise per Session:    Stress:     Feeling of Stress :    Social Connections:     Frequency of Communication with Friends and Family:     Frequency of Social Gatherings with Friends and Family:     Attends [employer] Services:     Active Member of Clubs or Organizations:     Attends Club or Organization Meetings:     Marital Status:    Intimate Partner Violence:     Fear of Current or Ex-Partner:     Emotionally Abused:     Physically Abused:     Sexually Abused:         Allergies   Allergen Reactions    Aspirin Other (comments)     Bleeding         Current Outpatient Medications Medication Sig Dispense Refill    glipiZIDE (GLUCOTROL) 5 mg tablet TAKE 1 TABLET BY MOUTH TWICE A  Tablet 1    atorvastatin (LIPITOR) 10 mg tablet Take 1 Tablet by mouth nightly. 90 Tablet 3    metFORMIN (GLUCOPHAGE) 1,000 mg tablet Take 1 Tablet by mouth two (2) times daily (with meals). 180 Tablet 1    sertraline (ZOLOFT) 50 mg tablet Take 1 Tablet by mouth daily. Take with 100mg tab for total of 150mg daily 90 Tablet 1    amitriptyline (ELAVIL) 50 mg tablet Take 1 Tablet by mouth nightly. 90 Tablet 4    nystatin (MYCOSTATIN) powder Apply  to affected area two (2) times a day. 60 g 0    glucose blood VI test strips (ASCENSIA AUTODISC VI, ONE TOUCH ULTRA TEST VI) strip Onetouch Ultra2 to fit pt's glucometer test twice a day  Dx E11.21 200 Strip 11    lancets misc Use to check blood sugar 2 time(s) a day and as needed. Please dispense brand compatible with patient's equipment. 200 Each 11    sertraline (Zoloft) 100 mg tablet Take 1 Tab by mouth daily. 90 Tab 4    ibuprofen (MOTRIN) 800 mg tablet Take 1 Tab by mouth every eight (8) hours as needed for Pain. 30 Tab 0    Blood-Glucose Meter monitoring kit Use to check blood sugar 1 time(s) a day and as needed. Please dispense any reliable brand glucometer covered by patients insurance. 1 Kit 0    levothyroxine (SYNTHROID) 100 mcg tablet Take  by mouth Daily (before breakfast).  ascorbic acid (VITAMIN C PO) Take  by mouth.  cholecalciferol, vitamin D3, (VITAMIN D3 PO) Take  by mouth.  b complex vitamins tablet Take 1 Tab by mouth daily.  triamcinolone acetonide (KENALOG) 0.1 % topical cream Apply  to affected area two (2) times a day. use thin layer 453.6 g 12    progesterone (PROMETRIUM) 200 mg capsule Take 450 mg by mouth daily.  OTHER,NON-FORMULARY, Indications: testosterone and estradio pellets subQ      MAGNESIUM CITRATE PO Take  by mouth.       raNITIdine (ZANTAC) 150 mg tablet Take 1 Tab by mouth two (2) times a day. (Patient not taking: Reported on 7/1/2021) 180 Tab 4       Patient Care Team:  Patient Care Team:  Alber Barrera NP as PCP - General (Nurse Practitioner)  Alber Barrera NP as PCP - [address] [place] [address]  [facility] Provider  Jostin Maher MD (Dermatology)    LABS:  None new to review    RADIOLOGY:  None new to review    Physical Exam    The patient appears well, she is pleasant, alert, oriented x 3, in no distress. ENT normal.  Neck supple. No adenopathy or thyromegaly. KARO. Lungs are clear, good air entry, no wheezes, rhonchi or rales. Cardiovascular ,S1 and S2 normal, no murmurs, regular rate and rhythm. No LAD. Chest wall negative for tenderness, breasts appear symmetrical  Abdomen is soft without tenderness, guarding, mass or organomegaly. /Anorectal, deferred. Muscleskeletal, no swelling, no tenderness, no injury. Extremities show no edema, normal peripheral pulses. Neurological is normal without focal findings. Skin: no concerning lesions. Psych: normal affect. Mood good. Oriented x 3. Judgement and insight intact. Vitals:    07/01/21 0815   BP: 125/82   Pulse: 79   Resp: 16   Temp: 97.2 °F (36.2 °C)   TempSrc: Temporal   SpO2: 96%   Weight: 200 lb 3.2 oz (90.8 kg)   Height: 5' 8\" (1.727 m)   PainSc:   0 - No pain       Assessment and Plan    Diagnoses and all orders for this visit:    Controlled type 2 diabetes mellitus with stage 1 chronic kidney disease, without long-term current use of insulin (HCC)  -     AMB POC HEMOGLOBIN A1C  -      DIABETES FOOT EXAM  -     glipiZIDE (GLUCOTROL) 5 mg tablet; TAKE 1 TABLET BY MOUTH TWICE A DAY, Normal, Disp-180 Tablet, R-1  -     metFORMIN (GLUCOPHAGE) 1,000 mg tablet; Take 1 Tablet by mouth two (2) times daily (with meals). , Normal, Disp-180 Tablet, R-1  -     URINALYSIS W/ RFLX MICROSCOPIC; Future  -     MICROALBUMIN, UR, RAND W/ MICROALB/CREAT RATIO; Future    Pure hypercholesterolemia  -     atorvastatin (LIPITOR) 10 mg tablet;  Take 1 Tablet by mouth nightly., Normal, Disp-90 Tablet, R-3    Anxiety  -     sertraline (ZOLOFT) 50 mg tablet; Take 1 Tablet by mouth daily. Take with 100mg tab for total of 150mg daily, Normal, Disp-90 Tablet, R-1    Irritable bowel syndrome with both constipation and diarrhea  -     amitriptyline (ELAVIL) 50 mg tablet; Take 1 Tablet by mouth nightly., Normal, Disp-90 Tablet, R-4    Recurrent UTI  -     URINALYSIS W/ RFLX MICROSCOPIC; Future         *Plan of care reviewed with patient. Patient in agreement with plan and expresses understanding. All questions answered and patient encouraged to call or RTO if further questions or concerns. 50% of 15 minutes spent on counseling and managing her care.         Shai Keating NP-C

## 2021-07-01 NOTE — PROGRESS NOTES
Priyanka Pathak presents today for   Chief Complaint   Patient presents with    Follow-up     A1C check       Priyanka Pathak preferred language for health care discussion is english/other. Personal Protective Equipment:   Personal Protective Equipment was used including: mask-surgical and hands-gloves. Patient was placed on no precaution(s). Patient was masked. Precautions:   Patient currently on None  Patient currently roomed with door closed    Is someone accompanying this pt? No    Is the patient using any DME equipment during OV? No    Depression Screening:  3 most recent PHQ Screens 5/19/2021   Little interest or pleasure in doing things Not at all   Feeling down, depressed, irritable, or hopeless Not at all   Total Score PHQ 2 0       Learning Assessment:  Learning Assessment 2/16/2015   PRIMARY LEARNER Patient   HIGHEST LEVEL OF EDUCATION - PRIMARY LEARNER  SOME COLLEGE   BARRIERS PRIMARY LEARNER COGNITIVE   CO-LEARNER CAREGIVER No   PRIMARY LANGUAGE ENGLISH   LEARNER PREFERENCE PRIMARY DEMONSTRATION       Abuse Screening:  Abuse Screening Questionnaire 2/16/2015   Do you ever feel afraid of your partner? N   Are you in a relationship with someone who physically or mentally threatens you? N   Is it safe for you to go home? Y       Fall Risk  Fall Risk Assessment, last 12 mths 3/15/2016   Able to walk? Yes   Fall in past 12 months? No       Pt currently taking Anticoagulant therapy? No    Coordination of Care:  1. Have you been to the ER, urgent care clinic since your last visit? Hospitalized since your last visit? No    2. Have you seen or consulted any other health care providers outside of the [address] [place] [address] since your last visit? Include any pap smears or colon screening.  No

## 2021-07-02 LAB
ALBUMIN/CREAT UR: 22 MG/G CREAT (ref 0–29)
APPEARANCE UR: ABNORMAL
BACTERIA #/AREA URNS HPF: ABNORMAL /[HPF]
BILIRUB UR QL STRIP: NEGATIVE
CASTS URNS QL MICRO: ABNORMAL /LPF
COLOR UR: YELLOW
CREAT UR-MCNC: 117.7 MG/DL
CRYSTALS URNS MICRO: ABNORMAL
EPI CELLS #/AREA URNS HPF: ABNORMAL /HPF (ref 0–10)
GLUCOSE UR QL: NEGATIVE
HGB UR QL STRIP: ABNORMAL
KETONES UR QL STRIP: NEGATIVE
LEUKOCYTE ESTERASE UR QL STRIP: ABNORMAL
MICRO URNS: ABNORMAL
MICROALBUMIN UR-MCNC: 25.7 UG/ML
NITRITE UR QL STRIP: POSITIVE
PH UR STRIP: 5.5 [PH] (ref 5–7.5)
PROT UR QL STRIP: ABNORMAL
RBC #/AREA URNS HPF: ABNORMAL /HPF (ref 0–2)
SP GR UR: 1.02 (ref 1–1.03)
UNIDENT CRYS URNS QL MICRO: PRESENT
UROBILINOGEN UR STRIP-MCNC: 0.2 MG/DL (ref 0.2–1)
WBC #/AREA URNS HPF: >30 /HPF (ref 0–5)

## 2021-07-05 DIAGNOSIS — N30.01 ACUTE CYSTITIS WITH HEMATURIA: Primary | ICD-10-CM

## 2021-07-05 RX ORDER — CEPHALEXIN 500 MG/1
500 CAPSULE ORAL 2 TIMES DAILY
Qty: 14 CAPSULE | Refills: 0 | Status: SHIPPED | OUTPATIENT
Start: 2021-07-05 | End: 2021-07-12

## 2021-07-05 NOTE — PROGRESS NOTES
Labs July 2, 2021, urinalysis cloudy 3+ leukocyte esterase trace of protein, trace of blood, positive for nitrites, white blood cells, red blood cells. Patient had no complaints of UTI. Will confirm with patient. Patient's microalbumin creatinine ratio normal.  Hemoglobin A1c 6.5, better than 2 years ago at 7.

## 2021-11-04 ENCOUNTER — OFFICE VISIT (OUTPATIENT)
Dept: FAMILY MEDICINE CLINIC | Age: 61
End: 2021-11-04
Payer: COMMERCIAL

## 2021-11-04 VITALS
HEIGHT: 68 IN | BODY MASS INDEX: 31.1 KG/M2 | OXYGEN SATURATION: 97 % | RESPIRATION RATE: 16 BRPM | SYSTOLIC BLOOD PRESSURE: 125 MMHG | TEMPERATURE: 97.4 F | HEART RATE: 74 BPM | WEIGHT: 205.2 LBS | DIASTOLIC BLOOD PRESSURE: 70 MMHG

## 2021-11-04 DIAGNOSIS — N18.1 CONTROLLED TYPE 2 DIABETES MELLITUS WITH STAGE 1 CHRONIC KIDNEY DISEASE, WITHOUT LONG-TERM CURRENT USE OF INSULIN (HCC): Primary | ICD-10-CM

## 2021-11-04 DIAGNOSIS — Z23 NEEDS FLU SHOT: ICD-10-CM

## 2021-11-04 DIAGNOSIS — E11.22 CONTROLLED TYPE 2 DIABETES MELLITUS WITH STAGE 1 CHRONIC KIDNEY DISEASE, WITHOUT LONG-TERM CURRENT USE OF INSULIN (HCC): Primary | ICD-10-CM

## 2021-11-04 LAB — HBA1C MFR BLD HPLC: 6.5 %

## 2021-11-04 PROCEDURE — 83036 HEMOGLOBIN GLYCOSYLATED A1C: CPT | Performed by: NURSE PRACTITIONER

## 2021-11-04 PROCEDURE — 90471 IMMUNIZATION ADMIN: CPT | Performed by: NURSE PRACTITIONER

## 2021-11-04 PROCEDURE — 99212 OFFICE O/P EST SF 10 MIN: CPT | Performed by: NURSE PRACTITIONER

## 2021-11-04 PROCEDURE — 90686 IIV4 VACC NO PRSV 0.5 ML IM: CPT | Performed by: NURSE PRACTITIONER

## 2021-11-04 RX ORDER — LEVOTHYROXINE, LIOTHYRONINE 38; 9 UG/1; UG/1
60 TABLET ORAL 2 TIMES DAILY
COMMUNITY
Start: 2021-10-18

## 2021-11-04 NOTE — PROGRESS NOTES
Speedy Johnson is a 59 y.o. female (: 1960) presenting to address:    Chief Complaint   Patient presents with    Medication Evaluation       Vitals:    21 0906   BP: 125/70   Pulse: 74   Resp: 16   Temp: 97.4 °F (36.3 °C)   TempSrc: Temporal   SpO2: 97%   Weight: 205 lb 3.2 oz (93.1 kg)   Height: 5' 8\" (1.727 m)       Hearing/Vision:   No exam data present    Learning Assessment:     Learning Assessment 2015   PRIMARY LEARNER Patient   HIGHEST LEVEL OF EDUCATION - PRIMARY LEARNER  SOME COLLEGE   BARRIERS PRIMARY LEARNER COGNITIVE   CO-LEARNER CAREGIVER No   PRIMARY LANGUAGE ENGLISH   LEARNER PREFERENCE PRIMARY DEMONSTRATION     Depression Screening:     3 most recent PHQ Screens 2021   Little interest or pleasure in doing things Not at all   Feeling down, depressed, irritable, or hopeless Not at all   Total Score PHQ 2 0     Fall Risk Assessment:     Fall Risk Assessment, last 12 mths 3/15/2016   Able to walk? Yes   Fall in past 12 months? No     Abuse Screening:     Abuse Screening Questionnaire 2015   Do you ever feel afraid of your partner? N   Are you in a relationship with someone who physically or mentally threatens you? N   Is it safe for you to go home? Y     ADL Assessment:   No flowsheet data found. Coordination of Care Questionaire:   1. \"Have you been to the ER, urgent care clinic since your last visit? Hospitalized since your last visit? \" No    2. \"Have you seen or consulted any other health care providers outside of the [address] [place] [address] since your last visit? \" No     3. For patients aged 39-70: Has the patient had a colonoscopy? Yes, HM satisfied with blue hyperlink     If the patient is female:    4. For patients aged 41-77: Has the patient had a mammogram within the past 2 years? Yes, HM satisfied with blue hyperlink    5. For patients aged 21-65: Has the patient had a pap smear? No    Advanced Directive:   1. Do you have an Advanced Directive? NO    2.  Would you like information on Advanced Directives?  NO    Pt wants flu shot

## 2021-11-04 NOTE — PROGRESS NOTES
OFFICE NOTE    Henrietta Godwin is a 59 y.o. female presenting today for office visit. 11/4/2021  8:07 AM    Chief Complaint   Patient presents with    Medication Evaluation       HPI:   In office visit related to type II diabetes. Patient says she is doing okay and she appears to look well. She'd like to get the 2nd shingles vaccine. But had a bit of a right arm rash but she is not sure if the shingles vaccine caused it. She has a history of granuloma annulare. I told her she could take Benadryl if she still wants to get the shingles vaccine. She will look into finishing the shingles series at her pharmacy. 11/4/2021 A1C 6.5%. Labs July 2, 2021, urinalysis cloudy 3+ leukocyte esterase trace of protein, trace of blood, positive for nitrites, white blood cells, red blood cells. Patient had no complaints of UTI. Will confirm with patient. Patient's microalbumin creatinine ratio normal.  Hemoglobin A1c 6.5, bePatter than 2 years ago at 7. Patient reports appetite is good, no urinary issues, sleeps well and regular bowel movements. Patient denies fever, chills, chest pain, shortness of breath, abdomen pain or dark tarry stool. Covid vaccine, done  Flu vaccine, today  Shingles, had one vaccine 2018  Pap smear, due she will schedule  Breast cancer screening, 3/2023  Colorectal cancer screening, 3/2025    ROS:    · General: negative for - chills, fever, weight changes or malaise  · HEENT: no sore throat, nasal congestion, vision problems or ear problems  · Respiratory: no cough, shortness of breath, or wheezing  · Cardiovascular: no chest pain, palpitations, or dyspnea on exertion  · Gastrointestinal: no abdominal pain, N/V, change in bowel habits, or black or bloody stools  · Musculoskeletal: no back pain, joint pain, joint stiffness, muscle pain or muscle weakness  · Neurological: no numbness, tingling, headache or dizziness  · Endo:  No polyuria or polydipsia.    · : no hematuria, dysuria, frequency, hesitancy, or nocturia. · Skin: No itching or rash, no open skin, no unusual lesions   · Psychological: negative for - anxiety, depression, sleep disturbances, suicidal or homicidal ideations     PHQ Screening   3 most recent PHQ Screens 5/19/2021   Little interest or pleasure in doing things Not at all   Feeling down, depressed, irritable, or hopeless Not at all   Total Score PHQ 2 0       History  Past Medical History:   Diagnosis Date    Anxiety and depression     Arthritis     Depression     Depression 2/16/2015    Endometriosis     Granuloma annulare     History of hepatitis C 10/24/2018    Menopause     Pure hypercholesterolemia 2/16/2015    Sinus congestion        Past Surgical History:   Procedure Laterality Date    HX CHOLECYSTECTOMY      HX CHOLECYSTECTOMY      HX CYST INCISION AND DRAINAGE      rt 2010???? Social History     Socioeconomic History    Marital status:      Spouse name: Not on file    Number of children: Not on file    Years of education: Not on file    Highest education level: Not on file   Occupational History    Not on file   Tobacco Use    Smoking status: Former Smoker    Smokeless tobacco: Never Used   Vaping Use    Vaping Use: Never used   Substance and Sexual Activity    Alcohol use: Yes     Alcohol/week: 2.5 standard drinks     Types: 3 Standard drinks or equivalent per week    Drug use: No    Sexual activity: Yes     Partners: Male   Other Topics Concern    Not on file   Social History Narrative    Not on file     Social Determinants of Health     Financial Resource Strain:     Difficulty of Paying Living Expenses: Not on file   Food Insecurity:     Worried About Running Out of Food in the Last Year: Not on file    Betsy of Food in the Last Year: Not on file   Transportation Needs:     Lack of Transportation (Medical): Not on file    Lack of Transportation (Non-Medical):  Not on file   Physical Activity:     Days of Exercise per Week: Not on file    Minutes of Exercise per Session: Not on file   Stress:     Feeling of Stress : Not on file   Social Connections:     Frequency of Communication with Friends and Family: Not on file    Frequency of Social Gatherings with Friends and Family: Not on file    Attends Advent Services: Not on file    Active Member of [address] [place] [address] [employer] or Organizations: Not on file    Attends Club or Organization Meetings: Not on file    Marital Status: Not on file   Intimate Partner Violence:     Fear of Current or Ex-Partner: Not on file    Emotionally Abused: Not on file    Physically Abused: Not on file    Sexually Abused: Not on file   Housing Stability:     Unable to Pay for Housing in the Last Year: Not on file    Number of [place] in the Last Year: Not on file    Unstable Housing in the Last Year: Not on file       Allergies   Allergen Reactions    Aspirin Other (comments)     Bleeding         Current Outpatient Medications   Medication Sig Dispense Refill    glipiZIDE (GLUCOTROL) 5 mg tablet TAKE 1 TABLET BY MOUTH TWICE A  Tablet 1    atorvastatin (LIPITOR) 10 mg tablet Take 1 Tablet by mouth nightly. 90 Tablet 3    metFORMIN (GLUCOPHAGE) 1,000 mg tablet Take 1 Tablet by mouth two (2) times daily (with meals). 180 Tablet 1    sertraline (ZOLOFT) 50 mg tablet Take 1 Tablet by mouth daily. Take with 100mg tab for total of 150mg daily 90 Tablet 1    amitriptyline (ELAVIL) 50 mg tablet Take 1 Tablet by mouth nightly. 90 Tablet 4    nystatin (MYCOSTATIN) powder Apply  to affected area two (2) times a day. 60 g 0    glucose blood VI test strips (ASCENSIA AUTODISC VI, ONE TOUCH ULTRA TEST VI) strip Onetouch Ultra2 to fit pt's glucometer test twice a day  Dx E11.21 200 Strip 11    lancets misc Use to check blood sugar 2 time(s) a day and as needed. Please dispense brand compatible with patient's equipment.  200 Each 11    sertraline (Zoloft) 100 mg tablet Take 1 Tab by mouth daily. 90 Tab 4    ibuprofen (MOTRIN) 800 mg tablet Take 1 Tab by mouth every eight (8) hours as needed for Pain. 30 Tab 0    Blood-Glucose Meter monitoring kit Use to check blood sugar 1 time(s) a day and as needed. Please dispense any reliable brand glucometer covered by patients insurance. 1 Kit 0    levothyroxine (SYNTHROID) 100 mcg tablet Take  by mouth Daily (before breakfast).  ascorbic acid (VITAMIN C PO) Take  by mouth.  cholecalciferol, vitamin D3, (VITAMIN D3 PO) Take  by mouth.  b complex vitamins tablet Take 1 Tab by mouth daily.  triamcinolone acetonide (KENALOG) 0.1 % topical cream Apply  to affected area two (2) times a day. use thin layer 453.6 g 12    progesterone (PROMETRIUM) 200 mg capsule Take 450 mg by mouth daily.  OTHER,NON-FORMULARY, Indications: testosterone and estradio pellets subQ      NP Thyroid 60 mg tablet Take 60 mg by mouth two (2) times a day. Patient Care Team:  Patient Care Team:  Remy Neighbours, NP as PCP - General (Nurse Practitioner)  Remy Neighbours, NP as PCP - [address] [place] [address] [facility]  Deja Sanchez MD (Dermatology)    LABS:  None new to review    RADIOLOGY:  None new to review    Physical Exam  Patient appears well, she is pleasant, alert, oriented x 3, in no distress. ENT normal.  Neck supple. No adenopathy or thyromegaly. KARO. Lungs are clear, good air entry, no wheezes, rhonchi or rales. Cardiovascular, S1 and S2 normal, no murmurs, regular rate and rhythm. No LAD. Chest wall negative for tenderness  Abdomen is soft without tenderness, guarding, mass or organomegaly. /Anorectal, deferred. Muscleskeletal, no swelling, no tenderness, no injury. Extremities show no edema, normal peripheral pulses. Neurological is normal without focal findings. Skin: no concerning lesions. Psych: normal affect. Mood good. Oriented x 3. Judgement and insight intact.       Vitals:    11/04/21 0906   BP: 125/70   Pulse: 74 Resp: 16   Temp: 97.4 °F (36.3 °C)   TempSrc: Temporal   SpO2: 97%   Weight: 205 lb 3.2 oz (93.1 kg)   Height: 5' 8\" (1.727 m)     Assessment and Plan    Diagnoses and all orders for this visit:    Controlled type 2 diabetes mellitus with stage 1 chronic kidney disease, without long-term current use of insulin (HCC)  -     AMB POC HEMOGLOBIN A1C    *Plan of care reviewed with patient. Patient in agreement with plan and expresses understanding. All questions answered and patient encouraged to call or RTO if further questions or concerns. 50% of 15 minutes spent on counseling and managing her care.       Zohaib Rg, TERRA-C

## 2022-01-03 DIAGNOSIS — F41.9 ANXIETY: ICD-10-CM

## 2022-01-03 RX ORDER — SERTRALINE HYDROCHLORIDE 100 MG/1
100 TABLET, FILM COATED ORAL DAILY
Qty: 90 TABLET | Refills: 4 | Status: SHIPPED | OUTPATIENT
Start: 2022-01-03

## 2022-01-07 DIAGNOSIS — E11.22 CONTROLLED TYPE 2 DIABETES MELLITUS WITH STAGE 1 CHRONIC KIDNEY DISEASE, WITHOUT LONG-TERM CURRENT USE OF INSULIN (HCC): ICD-10-CM

## 2022-01-07 DIAGNOSIS — N18.1 CONTROLLED TYPE 2 DIABETES MELLITUS WITH STAGE 1 CHRONIC KIDNEY DISEASE, WITHOUT LONG-TERM CURRENT USE OF INSULIN (HCC): ICD-10-CM

## 2022-01-09 RX ORDER — GLIPIZIDE 5 MG/1
TABLET ORAL
Qty: 180 TABLET | Refills: 1 | Status: SHIPPED | OUTPATIENT
Start: 2022-01-09 | End: 2022-08-10 | Stop reason: SDUPTHER

## 2022-01-09 RX ORDER — METFORMIN HYDROCHLORIDE 1000 MG/1
1000 TABLET ORAL 2 TIMES DAILY WITH MEALS
Qty: 180 TABLET | Refills: 1 | Status: SHIPPED | OUTPATIENT
Start: 2022-01-09 | End: 2022-08-12 | Stop reason: SDUPTHER

## 2022-03-19 PROBLEM — Z86.19 HISTORY OF HEPATITIS C: Status: ACTIVE | Noted: 2018-10-24

## 2022-03-19 PROBLEM — E11.21 TYPE 2 DIABETES WITH NEPHROPATHY (HCC): Status: ACTIVE | Noted: 2018-04-26

## 2022-03-21 ENCOUNTER — HOSPITAL ENCOUNTER (OUTPATIENT)
Dept: WOMENS IMAGING | Age: 62
Discharge: HOME | End: 2022-03-21
Attending: NURSE PRACTITIONER
Payer: COMMERCIAL

## 2022-03-21 DIAGNOSIS — Z12.31 VISIT FOR SCREENING MAMMOGRAM: ICD-10-CM

## 2022-03-21 PROCEDURE — 77063 BREAST TOMOSYNTHESIS BI: CPT

## 2022-08-10 DIAGNOSIS — K58.2 IRRITABLE BOWEL SYNDROME WITH BOTH CONSTIPATION AND DIARRHEA: ICD-10-CM

## 2022-08-10 DIAGNOSIS — E78.00 PURE HYPERCHOLESTEROLEMIA: ICD-10-CM

## 2022-08-10 DIAGNOSIS — E11.22 CONTROLLED TYPE 2 DIABETES MELLITUS WITH STAGE 1 CHRONIC KIDNEY DISEASE, WITHOUT LONG-TERM CURRENT USE OF INSULIN (HCC): ICD-10-CM

## 2022-08-10 DIAGNOSIS — N18.1 CONTROLLED TYPE 2 DIABETES MELLITUS WITH STAGE 1 CHRONIC KIDNEY DISEASE, WITHOUT LONG-TERM CURRENT USE OF INSULIN (HCC): ICD-10-CM

## 2022-08-10 RX ORDER — ATORVASTATIN CALCIUM 10 MG/1
10 TABLET, FILM COATED ORAL
Qty: 90 TABLET | Refills: 3 | Status: SHIPPED | OUTPATIENT
Start: 2022-08-10

## 2022-08-10 RX ORDER — GLIPIZIDE 5 MG/1
TABLET ORAL
Qty: 180 TABLET | Refills: 1 | Status: SHIPPED | OUTPATIENT
Start: 2022-08-10

## 2022-08-10 RX ORDER — AMITRIPTYLINE HYDROCHLORIDE 50 MG/1
50 TABLET, FILM COATED ORAL
Qty: 90 TABLET | Refills: 4 | Status: SHIPPED | OUTPATIENT
Start: 2022-08-10

## 2022-08-10 NOTE — TELEPHONE ENCOUNTER
Pt called to request medication refills for \"All\" of her medications, but did not know the names of the medications. .  Advised pt we would need to know the names of the medications. Pt was able to remember Amitriptyline, Atorvastatin and Glipizide. Please assist. Thank you. Requested Prescriptions     Pending Prescriptions Disp Refills    amitriptyline (ELAVIL) 50 mg tablet 90 Tablet 4     Sig: Take 1 Tablet by mouth nightly. atorvastatin (LIPITOR) 10 mg tablet 90 Tablet 3     Sig: Take 1 Tablet by mouth nightly.     glipiZIDE (GLUCOTROL) 5 mg tablet 180 Tablet 1     Sig: TAKE 1 TABLET BY MOUTH TWICE A DAY       LOV was on 11/4/2021    Future Appointments   Date Time Provider Tejas Howell   8/23/2022  8:30 AM TERRA Maldonado DMA AMB

## 2022-08-12 DIAGNOSIS — E11.22 CONTROLLED TYPE 2 DIABETES MELLITUS WITH STAGE 1 CHRONIC KIDNEY DISEASE, WITHOUT LONG-TERM CURRENT USE OF INSULIN (HCC): ICD-10-CM

## 2022-08-12 DIAGNOSIS — N18.1 CONTROLLED TYPE 2 DIABETES MELLITUS WITH STAGE 1 CHRONIC KIDNEY DISEASE, WITHOUT LONG-TERM CURRENT USE OF INSULIN (HCC): ICD-10-CM

## 2022-08-15 RX ORDER — METFORMIN HYDROCHLORIDE 1000 MG/1
1000 TABLET ORAL 2 TIMES DAILY WITH MEALS
Qty: 180 TABLET | Refills: 1 | Status: SHIPPED | OUTPATIENT
Start: 2022-08-15

## 2022-08-23 ENCOUNTER — OFFICE VISIT (OUTPATIENT)
Dept: FAMILY MEDICINE CLINIC | Age: 62
End: 2022-08-23
Payer: COMMERCIAL

## 2022-08-23 ENCOUNTER — HOSPITAL ENCOUNTER (OUTPATIENT)
Dept: LAB | Age: 62
Discharge: HOME | End: 2022-08-23
Payer: COMMERCIAL

## 2022-08-23 VITALS
HEART RATE: 65 BPM | TEMPERATURE: 97.6 F | BODY MASS INDEX: 30.46 KG/M2 | HEIGHT: 68 IN | DIASTOLIC BLOOD PRESSURE: 60 MMHG | RESPIRATION RATE: 16 BRPM | WEIGHT: 201 LBS | SYSTOLIC BLOOD PRESSURE: 110 MMHG | OXYGEN SATURATION: 96 %

## 2022-08-23 DIAGNOSIS — F41.9 ANXIETY: ICD-10-CM

## 2022-08-23 DIAGNOSIS — N81.2 CERVIX PROLAPSED INTO VAGINA: ICD-10-CM

## 2022-08-23 DIAGNOSIS — N18.1 CONTROLLED TYPE 2 DIABETES MELLITUS WITH STAGE 1 CHRONIC KIDNEY DISEASE, WITHOUT LONG-TERM CURRENT USE OF INSULIN (HCC): ICD-10-CM

## 2022-08-23 DIAGNOSIS — E78.00 PURE HYPERCHOLESTEROLEMIA: ICD-10-CM

## 2022-08-23 DIAGNOSIS — F32.0 CURRENT MILD EPISODE OF MAJOR DEPRESSIVE DISORDER WITHOUT PRIOR EPISODE (HCC): ICD-10-CM

## 2022-08-23 DIAGNOSIS — Z00.00 PREVENTATIVE HEALTH CARE: ICD-10-CM

## 2022-08-23 DIAGNOSIS — Z13.29 SCREENING FOR THYROID DISORDER: ICD-10-CM

## 2022-08-23 DIAGNOSIS — N18.1 CONTROLLED TYPE 2 DIABETES MELLITUS WITH STAGE 1 CHRONIC KIDNEY DISEASE, WITHOUT LONG-TERM CURRENT USE OF INSULIN (HCC): Primary | ICD-10-CM

## 2022-08-23 DIAGNOSIS — Z66 DO NOT RESUSCITATE: ICD-10-CM

## 2022-08-23 DIAGNOSIS — E11.22 CONTROLLED TYPE 2 DIABETES MELLITUS WITH STAGE 1 CHRONIC KIDNEY DISEASE, WITHOUT LONG-TERM CURRENT USE OF INSULIN (HCC): ICD-10-CM

## 2022-08-23 DIAGNOSIS — B37.49 GENITAL CANDIDIASIS: ICD-10-CM

## 2022-08-23 DIAGNOSIS — B18.2 HEP C W/O COMA, CHRONIC (HCC): ICD-10-CM

## 2022-08-23 DIAGNOSIS — E11.22 CONTROLLED TYPE 2 DIABETES MELLITUS WITH STAGE 1 CHRONIC KIDNEY DISEASE, WITHOUT LONG-TERM CURRENT USE OF INSULIN (HCC): Primary | ICD-10-CM

## 2022-08-23 LAB
25(OH)D3 SERPL-MCNC: 29.9 NG/ML (ref 30–100)
ALBUMIN SERPL-MCNC: 4 G/DL (ref 3.4–5)
ALBUMIN/GLOB SERPL: 1.5 {RATIO} (ref 0.8–1.7)
ALP SERPL-CCNC: 88 U/L (ref 45–117)
ALT SERPL-CCNC: 39 U/L (ref 13–56)
ANION GAP SERPL CALC-SCNC: 6 MMOL/L (ref 3–18)
APPEARANCE UR: CLEAR
AST SERPL-CCNC: 31 U/L (ref 10–38)
BACTERIA URNS QL MICRO: ABNORMAL /HPF
BASOPHILS # BLD: 0.1 K/UL (ref 0–0.1)
BASOPHILS NFR BLD: 1 % (ref 0–2)
BILIRUB SERPL-MCNC: 0.6 MG/DL (ref 0.2–1)
BILIRUB UR QL: NEGATIVE
BUN SERPL-MCNC: 9 MG/DL (ref 7–18)
BUN/CREAT SERPL: 9 (ref 12–20)
CALCIUM SERPL-MCNC: 8.8 MG/DL (ref 8.5–10.1)
CHLORIDE SERPL-SCNC: 106 MMOL/L (ref 100–111)
CHOLEST SERPL-MCNC: 159 MG/DL
CO2 SERPL-SCNC: 26 MMOL/L (ref 21–32)
COLOR UR: YELLOW
CREAT SERPL-MCNC: 1.04 MG/DL (ref 0.6–1.3)
CREAT UR-MCNC: 53 MG/DL (ref 30–125)
DIFFERENTIAL METHOD BLD: ABNORMAL
EOSINOPHIL # BLD: 0.2 K/UL (ref 0–0.4)
EOSINOPHIL NFR BLD: 2 % (ref 0–5)
EPITH CASTS URNS QL MICRO: ABNORMAL /LPF (ref 0–5)
ERYTHROCYTE [DISTWIDTH] IN BLOOD BY AUTOMATED COUNT: 12.7 % (ref 11.6–14.5)
EST. AVERAGE GLUCOSE BLD GHB EST-MCNC: 171 MG/DL
GLOBULIN SER CALC-MCNC: 2.7 G/DL (ref 2–4)
GLUCOSE SERPL-MCNC: 279 MG/DL (ref 74–99)
GLUCOSE UR STRIP.AUTO-MCNC: >1000 MG/DL
HBA1C MFR BLD: 7.6 % (ref 4.2–5.6)
HCT VFR BLD AUTO: 43.7 % (ref 35–45)
HDLC SERPL-MCNC: 35 MG/DL (ref 40–60)
HDLC SERPL: 4.5 {RATIO} (ref 0–5)
HGB BLD-MCNC: 14.3 G/DL (ref 12–16)
HGB UR QL STRIP: NEGATIVE
IMM GRANULOCYTES # BLD AUTO: 0 K/UL (ref 0–0.04)
IMM GRANULOCYTES NFR BLD AUTO: 0 % (ref 0–0.5)
KETONES UR QL STRIP.AUTO: ABNORMAL MG/DL
LDLC SERPL CALC-MCNC: 78.4 MG/DL (ref 0–100)
LEUKOCYTE ESTERASE UR QL STRIP.AUTO: ABNORMAL
LIPID PROFILE,FLP: ABNORMAL
LYMPHOCYTES # BLD: 1.9 K/UL (ref 0.9–3.6)
LYMPHOCYTES NFR BLD: 18 % (ref 21–52)
MCH RBC QN AUTO: 31.6 PG (ref 24–34)
MCHC RBC AUTO-ENTMCNC: 32.7 G/DL (ref 31–37)
MCV RBC AUTO: 96.7 FL (ref 78–100)
MICROALBUMIN UR-MCNC: 1.08 MG/DL (ref 0–3)
MICROALBUMIN/CREAT UR-RTO: 20 MG/G (ref 0–30)
MONOCYTES # BLD: 0.7 K/UL (ref 0.05–1.2)
MONOCYTES NFR BLD: 6 % (ref 3–10)
NEUTS SEG # BLD: 7.7 K/UL (ref 1.8–8)
NEUTS SEG NFR BLD: 73 % (ref 40–73)
NITRITE UR QL STRIP.AUTO: POSITIVE
NRBC # BLD: 0 K/UL (ref 0–0.01)
NRBC BLD-RTO: 0 PER 100 WBC
PH UR STRIP: 5.5 [PH] (ref 5–8)
PLATELET # BLD AUTO: 202 K/UL (ref 135–420)
PMV BLD AUTO: 11.7 FL (ref 9.2–11.8)
POTASSIUM SERPL-SCNC: 4.5 MMOL/L (ref 3.5–5.5)
PROT SERPL-MCNC: 6.7 G/DL (ref 6.4–8.2)
PROT UR STRIP-MCNC: NEGATIVE MG/DL
RBC # BLD AUTO: 4.52 M/UL (ref 4.2–5.3)
RBC #/AREA URNS HPF: NEGATIVE /HPF (ref 0–5)
SODIUM SERPL-SCNC: 138 MMOL/L (ref 136–145)
SP GR UR REFRACTOMETRY: 1.03 (ref 1–1.03)
TRIGL SERPL-MCNC: 228 MG/DL (ref ?–150)
TSH SERPL DL<=0.05 MIU/L-ACNC: 3.3 UIU/ML (ref 0.36–3.74)
UROBILINOGEN UR QL STRIP.AUTO: 0.2 EU/DL (ref 0.2–1)
VLDLC SERPL CALC-MCNC: 45.6 MG/DL
WBC # BLD AUTO: 10.6 K/UL (ref 4.6–13.2)
WBC URNS QL MICRO: ABNORMAL /HPF (ref 0–4)

## 2022-08-23 PROCEDURE — 82306 VITAMIN D 25 HYDROXY: CPT

## 2022-08-23 PROCEDURE — 80053 COMPREHEN METABOLIC PANEL: CPT

## 2022-08-23 PROCEDURE — 82043 UR ALBUMIN QUANTITATIVE: CPT

## 2022-08-23 PROCEDURE — 36415 COLL VENOUS BLD VENIPUNCTURE: CPT

## 2022-08-23 PROCEDURE — 80061 LIPID PANEL: CPT

## 2022-08-23 PROCEDURE — 81001 URINALYSIS AUTO W/SCOPE: CPT

## 2022-08-23 PROCEDURE — 85025 COMPLETE CBC W/AUTO DIFF WBC: CPT

## 2022-08-23 PROCEDURE — 99497 ADVNCD CARE PLAN 30 MIN: CPT | Performed by: NURSE PRACTITIONER

## 2022-08-23 PROCEDURE — 84443 ASSAY THYROID STIM HORMONE: CPT

## 2022-08-23 PROCEDURE — 83036 HEMOGLOBIN GLYCOSYLATED A1C: CPT

## 2022-08-23 PROCEDURE — 99214 OFFICE O/P EST MOD 30 MIN: CPT | Performed by: NURSE PRACTITIONER

## 2022-08-23 RX ORDER — SERTRALINE HYDROCHLORIDE 50 MG/1
50 TABLET, FILM COATED ORAL DAILY
Qty: 90 TABLET | Refills: 1 | Status: SHIPPED | OUTPATIENT
Start: 2022-08-23

## 2022-08-23 RX ORDER — NYSTATIN 100000 [USP'U]/G
POWDER TOPICAL 2 TIMES DAILY
Qty: 60 G | Refills: 2 | Status: SHIPPED | OUTPATIENT
Start: 2022-08-23

## 2022-08-23 NOTE — PROGRESS NOTES
Rahel Smiley is a 59 y.o. female  established patient, here for evaluation of the following chief complaint(s):  Chief Complaint   Patient presents with    Medication Refill    Hypertension    Cholesterol Problem    Diabetes          Assessment and Plan  1. Controlled type 2 diabetes mellitus with stage 1 chronic kidney disease, without long-term current use of insulin (HCC)  -     MICROALBUMIN, UR, RAND W/ MICROALB/CREAT RATIO; Future  -     HEMOGLOBIN A1C WITH EAG; Future  -     METABOLIC PANEL, COMPREHENSIVE; Future  -      DIABETES FOOT EXAM  2. Pure hypercholesterolemia  -     LIPID PANEL; Future  3. Anxiety  -     REFERRAL TO PSYCHIATRY  -     sertraline (ZOLOFT) 50 mg tablet; Take 1 Tablet by mouth daily. Take with 100mg tab for total of 150mg daily, Normal, Disp-90 Tablet, R-1  4. Preventative health care  -     CBC WITH AUTOMATED DIFF; Future  -     METABOLIC PANEL, COMPREHENSIVE; Future  -     URINALYSIS W/ RFLX MICROSCOPIC; Future  -     VITAMIN D, 25 HYDROXY; Future  5. Screening for thyroid disorder  -     TSH 3RD GENERATION; Future  6. Hep C w/o coma, chronic (HCC)  7. Genital candidiasis  -     nystatin (MYCOSTATIN) powder; Apply  to affected area two (2) times a day., Normal, Disp-60 g, R-2  8. Cervix prolapsed into vagina  -     REFERRAL TO GYNECOLOGY  9. Current mild episode of major depressive disorder without prior episode (HCC)  -     REFERRAL TO PSYCHIATRY  10. Do not resuscitate     Follow-up and Dispositions    Return in about 3 months (around 11/23/2022) for A1C, DM, 15. HPI:   In office visit. Patient is well. Patient has a history of depression and anxiety. She denies feelings of hurting herself. She has had trouble getting Zoloft 50 mg and 100 mg at the same because of her insurance and the \"computer at he pharmacy. \"     Patient says she is having pain in her \"cervix\" at times. She feels at times her cervix \"falls. \" She feels okay now and \"nothing hanging out. \" She takes urostat regularly for dysuria. She has no \"vaginal burning today. \" Patient will schedule her pap smear next time. She says she had a partial hysterotomy 2005 and they took her \"uterus. \"     ROS:    General: negative for - chills, fever, weight changes or malaise  HEENT: no sore throat, nasal congestion, vision problems or ear problems  Respiratory: no cough, shortness of breath, or wheezing  Cardiovascular: no chest pain, palpitations, or dyspnea on exertion  Gastrointestinal: no abdominal pain, N/V, change in bowel habits  Musculoskeletal: no back pain or joint pain  Neurological: no headache or dizziness  Endo:  No polyuria or polydipsia  : no urinary  Psychological: negative for - anxiety, depression, sleeps issues    Prior to Admission medications    Medication Sig Start Date End Date Taking? Authorizing Provider   nystatin (MYCOSTATIN) powder Apply  to affected area two (2) times a day. 8/23/22  Yes Gabe Nevarez NP   sertraline (ZOLOFT) 50 mg tablet Take 1 Tablet by mouth daily. Take with 100mg tab for total of 150mg daily 8/23/22  Yes Elaina Nevarez NP   metFORMIN (GLUCOPHAGE) 1,000 mg tablet Take 1 Tablet by mouth two (2) times daily (with meals). 8/15/22  Yes Gabe Nevarez NP   amitriptyline (ELAVIL) 50 mg tablet Take 1 Tablet by mouth nightly. 8/10/22  Yes Gabe Nevarez NP   atorvastatin (LIPITOR) 10 mg tablet Take 1 Tablet by mouth nightly. 8/10/22  Yes Elaina Nevarez NP   glipiZIDE (GLUCOTROL) 5 mg tablet TAKE 1 TABLET BY MOUTH TWICE A DAY 8/10/22  Yes Elaina Nevarez NP   sertraline (Zoloft) 100 mg tablet Take 1 Tablet by mouth daily. 1/3/22  Yes Sophie Brown NP NP Thyroid 60 mg tablet Take 60 mg by mouth two (2) times a day.  10/18/21  Yes Provider, Historical   glucose blood VI test strips (ASCENSIA AUTODISC VI, ONE TOUCH ULTRA TEST VI) strip Onetouch Ultra2 to fit pt's glucometer test twice a day  Dx E11.21 12/17/20  Yes Eladia Valdez NP   lancets misc Use to check blood sugar 2 time(s) a day and as needed. Please dispense brand compatible with patient's equipment. 12/17/20  Yes Mathew Sanchez NP   ibuprofen (MOTRIN) 800 mg tablet Take 1 Tab by mouth every eight (8) hours as needed for Pain. 12/14/20  Yes Mathew Sanchez NP   Blood-Glucose Meter monitoring kit Use to check blood sugar 1 time(s) a day and as needed. Please dispense any reliable brand glucometer covered by patients insurance. 12/14/20  Yes Mathew Sanchez NP   levothyroxine (SYNTHROID) 100 mcg tablet Take  by mouth Daily (before breakfast). Yes Provider, Historical   ascorbic acid (VITAMIN C PO) Take  by mouth. Yes Provider, Historical   cholecalciferol, vitamin D3, (VITAMIN D3 PO) Take  by mouth. Yes Provider, Historical   b complex vitamins tablet Take 1 Tab by mouth daily. Yes Provider, Historical   triamcinolone acetonide (KENALOG) 0.1 % topical cream Apply  to affected area two (2) times a day. use thin layer 1/13/20  Yes Mathew Sanchez NP   progesterone (PROMETRIUM) 200 mg capsule Take 450 mg by mouth daily. Yes Provider, Historical   OTHER,NON-FORMULARY, Indications: testosterone and estradio pellets subQ   Yes Provider, Historical   sertraline (ZOLOFT) 50 mg tablet Take 1 Tablet by mouth daily. Take with 100mg tab for total of 150mg daily 7/1/21 8/23/22  Remy Neighbours, NP   nystatin (MYCOSTATIN) powder Apply  to affected area two (2) times a day. 1/4/21 8/23/22  Mathew Sanchez NP        Results for orders placed or performed in visit on 11/04/21   AMB POC HEMOGLOBIN A1C   Result Value Ref Range    Hemoglobin A1c (POC) 6.5 %        Physical Exam  Patient appears well, she is pleasant, alert, oriented x 3, in no distress. ENT normal.  Neck supple. No adenopathy or thyromegaly. KARO. Lungs are clear, good air entry, no wheezes  Cardiovascular, S1 and S2 normal, no murmurs, regular rate and rhythm.    Chest wall negative for tenderness  Abdomen is soft without tenderness, guarding  /Anorectal, deferred. Muscleskeletal, no swelling, no tenderness, no injury. Extremities show no edema  Neurological is normal without focal findings. Skin: no concerning lesions. Psych: normal affect. Mood good. Oriented x 3. Diabetic foot exam:     Left Foot:   Visual Exam: normal    Pulse DP: 2+ (normal)   Filament test: normal sensation          Right Foot:   Visual Exam: normal    Pulse DP: 2+ (normal)   Filament test: normal sensation          Vitals:    08/23/22 0816   BP: 110/60   Pulse: 65   Resp: 16   Temp: 97.6 °F (36.4 °C)   TempSrc: Temporal   SpO2: 96%   Weight: 201 lb (91.2 kg)   Height: 5' 8\" (1.727 m)   PainSc:   0 - No pain       *Plan of care reviewed with patient. Patient in agreement with plan and expresses understanding. All questions answered and patient encouraged to call or RTO if further questions or concerns. On this date 08/23/2022 I have spent 39 minutes reviewing previous notes, test results and face to face with the patient discussing the diagnosis and importance of compliance with the treatment plan as well as documenting on the day of the visit.       ESHA Walker

## 2022-08-23 NOTE — ACP (ADVANCE CARE PLANNING)
Advance Care Planning     General Advance Care Planning (ACP) Conversation      Date of Conversation: 8/23/2022  Conducted with: Patient with Decision Making Capacity    Healthcare Decision Maker:     Primary Decision Maker: Andrew Velasquez - [phone]  Click here to complete [address] including selection of the Healthcare Decision Maker Relationship (ie \"Primary\")  Today we documented Decision Maker(s) consistent with Legal Next of Kin hierarchy.     Content/Action Overview:   Has NO ACP documents/care preferences - requested patient complete ACP documents  Reviewed DNR/DNI and patient confirms current DNR status - completed forms on file (place new order if needed)  Topics discussed: treatment goals, ventilation preferences, and resuscitation preferences  Additional Comments: DO NOT resuscitate and her sister will make decision if needed related to to life support       Length of Voluntary ACP Conversation in minutes:  30 minutes    Lauralee Holstein, NP

## 2022-08-23 NOTE — PROGRESS NOTES
Lexy Lilly is a 59 y.o. presents today for   Chief Complaint   Patient presents with    Medication Refill    Hypertension    Cholesterol Problem    Diabetes     Is someone accompanying this pt? No    Is the patient using any DME equipment during OV? No     Visit Vitals  /60 (BP 1 Location: Right arm, BP Patient Position: Sitting, BP Cuff Size: Large adult)   Pulse 65   Temp 97.6 °F (36.4 °C) (Temporal)   Resp 16   Ht 5' 8\" (1.727 m)   Wt 201 lb (91.2 kg)   SpO2 96%   BMI 30.56 kg/m²       Depression Screening:   3 most recent PHQ Screens 8/23/2022   Little interest or pleasure in doing things Not at all   Feeling down, depressed, irritable, or hopeless Nearly every day   Total Score PHQ 2 3   Trouble falling or staying asleep, or sleeping too much Nearly every day   Feeling tired or having little energy Nearly every day   Poor appetite, weight loss, or overeating Nearly every day   Feeling bad about yourself - or that you are a failure or have let yourself or your family down Not at all   Trouble concentrating on things such as school, work, reading, or watching TV Nearly every day   Moving or speaking so slowly that other people could have noticed; or the opposite being so fidgety that others notice Not at all   Thoughts of being better off dead, or hurting yourself in some way Not at all   PHQ 9 Score 15   How difficult have these problems made it for you to do your work, take care of your home and get along with others Not difficult at all       Health Maintenance: reviewed and discussed and ordered per Provider.     Health Maintenance Due   Topic Date Due    Cervical cancer screen  Never done    Shingrix Vaccine Age 49> (1 of 2) Never done    Pneumococcal 0-64 years (2 - PCV) 03/17/2016    Eye Exam Retinal or Dilated  10/24/2020    COVID-19 Vaccine (3 - Booster for Pfizer series) 07/04/2021    Lipid Screen  12/14/2021    Depression Monitoring  05/19/2022    Foot Exam Q1  07/01/2022    MICROALBUMIN Q1 07/01/2022         Coordination of Care:   1. \"Have you been to the ER, urgent care clinic since your last visit? Hospitalized since your last visit? \" No    2. \"Have you seen or consulted any other health care providers outside of the [address] [place] [address] since your last visit? \" No     3. For patients aged 39-70: Has the patient had a colonoscopy / FIT/ Cologuard? Yes - Care Gap present. Most recent result on file    If the patient is female:    4. For patients aged 41-77: Has the patient had a mammogram within the past 2 years? Yes - Care Gap present. Most recent result on file    5. For patients aged 21-65: Has the patient had a pap smear? No, patient reports that she had a partial hysterectomy in 2005. Advanced Directive:  1. Do you have an Advanced Directive? No      2. Would you like information on Advanced Directives?  Yes    HERNANDEZ Boucher

## 2022-09-20 NOTE — PROGRESS NOTES
Your vitamin D was just low side of normal.  You can purchase vitamin D 3 over-the-counter and take 2000 units a day. Hemoglobin A1c 7.6%, better than a year ago at 10.2%. Your triglycerides were high and your good cholesterol/HDL was low. Look for foods that you enjoy that contain HDL cholesterol such as walnuts, avocados, olive oil and salmon. Your GFR was 54 this is related to your kidney function. Please continue to take your diabetes medications as directed as elevated blood sugar is bad for your kidneys. Your random blood glucose was high at 279. TSH was normal.  Your urine microalbumin was normal at 20. You had a lot of glucose in your urine with nittes. Are you have any symptoms of a UTI such as urgency, frequency or burning when you urinate?

## 2023-02-15 DIAGNOSIS — E11.21 TYPE 2 DIABETES WITH NEPHROPATHY (HCC): Primary | ICD-10-CM

## 2023-02-15 RX ORDER — GLIPIZIDE 5 MG/1
TABLET ORAL
COMMUNITY
Start: 2022-08-10 | End: 2023-02-15 | Stop reason: SDUPTHER

## 2023-02-15 RX ORDER — GLIPIZIDE 5 MG/1
TABLET ORAL
Qty: 60 TABLET | Refills: 0 | Status: SHIPPED | OUTPATIENT
Start: 2023-02-15

## 2023-02-15 NOTE — TELEPHONE ENCOUNTER
This patient contacted the office for the following prescriptions to be refilled:    Medication requested :   Requested Prescriptions     Pending Prescriptions Disp Refills    glipiZIDE (GLUCOTROL) 5 MG tablet 60 tablet       PCP: KATY Boss CNP  LOV: @JOSE DAVID@  (look in previous encounters if not listed)  NOV DMA: Visit date not found  FUTURE APPT:   Future Appointments   Date Time Provider Alexis Teran   3/23/2023  7:30 AM [address] [facility] [address]  [facility]         Thank you. Name band;

## 2023-03-23 DIAGNOSIS — F32.A DEPRESSION, UNSPECIFIED DEPRESSION TYPE: Primary | ICD-10-CM

## 2023-03-23 DIAGNOSIS — E78.00 PURE HYPERCHOLESTEROLEMIA, UNSPECIFIED: ICD-10-CM

## 2023-03-23 DIAGNOSIS — F41.9 ANXIETY DISORDER, UNSPECIFIED TYPE: ICD-10-CM

## 2023-03-27 RX ORDER — SERTRALINE HYDROCHLORIDE 100 MG/1
100 TABLET, FILM COATED ORAL DAILY
Qty: 90 TABLET | Refills: 0 | Status: SHIPPED | OUTPATIENT
Start: 2023-03-27

## 2023-06-24 SDOH — ECONOMIC STABILITY: FOOD INSECURITY: WITHIN THE PAST 12 MONTHS, YOU WORRIED THAT YOUR FOOD WOULD RUN OUT BEFORE YOU GOT MONEY TO BUY MORE.: PATIENT DECLINED

## 2023-06-24 SDOH — ECONOMIC STABILITY: TRANSPORTATION INSECURITY
IN THE PAST 12 MONTHS, HAS LACK OF TRANSPORTATION KEPT YOU FROM MEETINGS, WORK, OR FROM GETTING THINGS NEEDED FOR DAILY LIVING?: NO

## 2023-06-24 SDOH — ECONOMIC STABILITY: HOUSING INSECURITY
IN THE LAST 12 MONTHS, WAS THERE A TIME WHEN YOU DID NOT HAVE A STEADY PLACE TO SLEEP OR SLEPT IN A SHELTER (INCLUDING NOW)?: NO

## 2023-06-24 SDOH — ECONOMIC STABILITY: FOOD INSECURITY: WITHIN THE PAST 12 MONTHS, THE FOOD YOU BOUGHT JUST DIDN'T LAST AND YOU DIDN'T HAVE MONEY TO GET MORE.: PATIENT DECLINED

## 2023-06-24 SDOH — ECONOMIC STABILITY: INCOME INSECURITY: HOW HARD IS IT FOR YOU TO PAY FOR THE VERY BASICS LIKE FOOD, HOUSING, MEDICAL CARE, AND HEATING?: SOMEWHAT HARD

## 2023-06-26 ENCOUNTER — OFFICE VISIT (OUTPATIENT)
Facility: CLINIC | Age: 63
End: 2023-06-26
Payer: COMMERCIAL

## 2023-06-26 ENCOUNTER — HOSPITAL ENCOUNTER (OUTPATIENT)
Facility: HOSPITAL | Age: 63
Setting detail: SPECIMEN
Discharge: HOME | End: 2023-06-29
Payer: COMMERCIAL

## 2023-06-26 VITALS
OXYGEN SATURATION: 91 % | HEIGHT: 68 IN | BODY MASS INDEX: 30.25 KG/M2 | DIASTOLIC BLOOD PRESSURE: 74 MMHG | RESPIRATION RATE: 16 BRPM | SYSTOLIC BLOOD PRESSURE: 116 MMHG | WEIGHT: 199.6 LBS | HEART RATE: 65 BPM | TEMPERATURE: 97.7 F

## 2023-06-26 DIAGNOSIS — L92.0 GRANULOMA ANNULARE: ICD-10-CM

## 2023-06-26 DIAGNOSIS — K58.2 MIXED IRRITABLE BOWEL SYNDROME: ICD-10-CM

## 2023-06-26 DIAGNOSIS — K31.89 STOMACH SPASM: ICD-10-CM

## 2023-06-26 DIAGNOSIS — Z00.00 PREVENTATIVE HEALTH CARE: ICD-10-CM

## 2023-06-26 DIAGNOSIS — F41.9 ANXIETY DISORDER, UNSPECIFIED TYPE: ICD-10-CM

## 2023-06-26 DIAGNOSIS — E78.00 PURE HYPERCHOLESTEROLEMIA: ICD-10-CM

## 2023-06-26 DIAGNOSIS — E11.21 TYPE 2 DIABETES WITH NEPHROPATHY (HCC): ICD-10-CM

## 2023-06-26 DIAGNOSIS — B37.2 SKIN YEAST INFECTION: ICD-10-CM

## 2023-06-26 DIAGNOSIS — F32.0 MAJOR DEPRESSIVE DISORDER, SINGLE EPISODE, MILD (HCC): Primary | ICD-10-CM

## 2023-06-26 DIAGNOSIS — B18.2 CHRONIC HEPATITIS C WITHOUT HEPATIC COMA (HCC): ICD-10-CM

## 2023-06-26 DIAGNOSIS — E11.22 TYPE 2 DIABETES MELLITUS WITH CHRONIC KIDNEY DISEASE, WITHOUT LONG-TERM CURRENT USE OF INSULIN, UNSPECIFIED CKD STAGE (HCC): ICD-10-CM

## 2023-06-26 DIAGNOSIS — Z53.20 HIV SCREENING DECLINED: ICD-10-CM

## 2023-06-26 DIAGNOSIS — F32.A DEPRESSION, UNSPECIFIED DEPRESSION TYPE: ICD-10-CM

## 2023-06-26 DIAGNOSIS — Z12.11 SCREEN FOR COLON CANCER: ICD-10-CM

## 2023-06-26 LAB
ALBUMIN SERPL-MCNC: 3.9 G/DL (ref 3.4–5)
ALBUMIN/GLOB SERPL: 1.3 (ref 0.8–1.7)
ALP SERPL-CCNC: 93 U/L (ref 45–117)
ALT SERPL-CCNC: 35 U/L (ref 13–56)
ANION GAP SERPL CALC-SCNC: 7 MMOL/L (ref 3–18)
APPEARANCE UR: ABNORMAL
AST SERPL-CCNC: 17 U/L (ref 10–38)
BACTERIA URNS QL MICRO: ABNORMAL /HPF
BASOPHILS # BLD: 0.1 K/UL (ref 0–0.1)
BASOPHILS NFR BLD: 1 % (ref 0–2)
BILIRUB SERPL-MCNC: 0.6 MG/DL (ref 0.2–1)
BILIRUB UR QL: NEGATIVE
BUN SERPL-MCNC: 10 MG/DL (ref 7–18)
BUN/CREAT SERPL: 12 (ref 12–20)
CALCIUM SERPL-MCNC: 9.4 MG/DL (ref 8.5–10.1)
CHLORIDE SERPL-SCNC: 107 MMOL/L (ref 100–111)
CHOLEST SERPL-MCNC: 161 MG/DL
CO2 SERPL-SCNC: 25 MMOL/L (ref 21–32)
COLOR UR: YELLOW
CREAT SERPL-MCNC: 0.85 MG/DL (ref 0.6–1.3)
DIFFERENTIAL METHOD BLD: ABNORMAL
EOSINOPHIL # BLD: 0.2 K/UL (ref 0–0.4)
EOSINOPHIL NFR BLD: 2 % (ref 0–5)
EPITH CASTS URNS QL MICRO: ABNORMAL /LPF (ref 0–5)
ERYTHROCYTE [DISTWIDTH] IN BLOOD BY AUTOMATED COUNT: 12.8 % (ref 11.6–14.5)
EST. AVERAGE GLUCOSE BLD GHB EST-MCNC: 154 MG/DL
GLOBULIN SER CALC-MCNC: 2.9 G/DL (ref 2–4)
GLUCOSE SERPL-MCNC: 190 MG/DL (ref 74–99)
GLUCOSE UR STRIP.AUTO-MCNC: >1000 MG/DL
HBA1C MFR BLD: 7 % (ref 4.2–5.6)
HCT VFR BLD AUTO: 43.6 % (ref 35–45)
HDLC SERPL-MCNC: 43 MG/DL (ref 40–60)
HDLC SERPL: 3.7 (ref 0–5)
HGB BLD-MCNC: 14.1 G/DL (ref 12–16)
HGB UR QL STRIP: NEGATIVE
IMM GRANULOCYTES # BLD AUTO: 0.1 K/UL (ref 0–0.04)
IMM GRANULOCYTES NFR BLD AUTO: 0 % (ref 0–0.5)
KETONES UR QL STRIP.AUTO: NEGATIVE MG/DL
LDLC SERPL CALC-MCNC: 70.4 MG/DL (ref 0–100)
LEUKOCYTE ESTERASE UR QL STRIP.AUTO: ABNORMAL
LIPID PANEL: ABNORMAL
LYMPHOCYTES # BLD: 2 K/UL (ref 0.9–3.6)
LYMPHOCYTES NFR BLD: 17 % (ref 21–52)
MCH RBC QN AUTO: 30.6 PG (ref 24–34)
MCHC RBC AUTO-ENTMCNC: 32.3 G/DL (ref 31–37)
MCV RBC AUTO: 94.6 FL (ref 78–100)
MONOCYTES # BLD: 0.8 K/UL (ref 0.05–1.2)
MONOCYTES NFR BLD: 6 % (ref 3–10)
NEUTS SEG # BLD: 8.8 K/UL (ref 1.8–8)
NEUTS SEG NFR BLD: 74 % (ref 40–73)
NITRITE UR QL STRIP.AUTO: POSITIVE
NRBC # BLD: 0 K/UL (ref 0–0.01)
NRBC BLD-RTO: 0 PER 100 WBC
PH UR STRIP: 5.5 (ref 5–8)
PLATELET # BLD AUTO: 201 K/UL (ref 135–420)
PMV BLD AUTO: 12.2 FL (ref 9.2–11.8)
POTASSIUM SERPL-SCNC: 4.1 MMOL/L (ref 3.5–5.5)
PROT SERPL-MCNC: 6.8 G/DL (ref 6.4–8.2)
PROT UR STRIP-MCNC: NEGATIVE MG/DL
RBC # BLD AUTO: 4.61 M/UL (ref 4.2–5.3)
RBC #/AREA URNS HPF: NEGATIVE /HPF (ref 0–5)
SODIUM SERPL-SCNC: 139 MMOL/L (ref 136–145)
SP GR UR REFRACTOMETRY: 1.02 (ref 1–1.03)
T4 FREE SERPL-MCNC: 0.7 NG/DL (ref 0.7–1.5)
TRIGL SERPL-MCNC: 238 MG/DL
TSH SERPL DL<=0.05 MIU/L-ACNC: 2.69 UIU/ML (ref 0.36–3.74)
UROBILINOGEN UR QL STRIP.AUTO: 0.2 EU/DL (ref 0.2–1)
VLDLC SERPL CALC-MCNC: 47.6 MG/DL
WBC # BLD AUTO: 11.9 K/UL (ref 4.6–13.2)
WBC URNS QL MICRO: ABNORMAL /HPF (ref 0–4)

## 2023-06-26 PROCEDURE — 84443 ASSAY THYROID STIM HORMONE: CPT

## 2023-06-26 PROCEDURE — 83036 HEMOGLOBIN GLYCOSYLATED A1C: CPT

## 2023-06-26 PROCEDURE — 81001 URINALYSIS AUTO W/SCOPE: CPT

## 2023-06-26 PROCEDURE — 36415 COLL VENOUS BLD VENIPUNCTURE: CPT

## 2023-06-26 PROCEDURE — 80061 LIPID PANEL: CPT

## 2023-06-26 PROCEDURE — 85025 COMPLETE CBC W/AUTO DIFF WBC: CPT

## 2023-06-26 PROCEDURE — 84439 ASSAY OF FREE THYROXINE: CPT

## 2023-06-26 PROCEDURE — 99214 OFFICE O/P EST MOD 30 MIN: CPT | Performed by: NURSE PRACTITIONER

## 2023-06-26 PROCEDURE — 80053 COMPREHEN METABOLIC PANEL: CPT

## 2023-06-26 RX ORDER — TRIAMCINOLONE ACETONIDE 1 MG/G
CREAM TOPICAL 2 TIMES DAILY
Qty: 80 G | Refills: 5 | Status: SHIPPED | OUTPATIENT
Start: 2023-06-26

## 2023-06-26 RX ORDER — ATORVASTATIN CALCIUM 10 MG/1
10 TABLET, FILM COATED ORAL DAILY
Qty: 90 TABLET | Refills: 3 | Status: SHIPPED | OUTPATIENT
Start: 2023-06-26

## 2023-06-26 RX ORDER — AMITRIPTYLINE HYDROCHLORIDE 50 MG/1
50 TABLET, FILM COATED ORAL NIGHTLY
Qty: 90 TABLET | Refills: 3 | Status: SHIPPED | OUTPATIENT
Start: 2023-06-26

## 2023-06-26 RX ORDER — NYSTATIN 100000 [USP'U]/G
POWDER TOPICAL
Qty: 60 G | Refills: 5 | Status: SHIPPED | OUTPATIENT
Start: 2023-06-26

## 2023-06-26 RX ORDER — SERTRALINE HYDROCHLORIDE 100 MG/1
150 TABLET, FILM COATED ORAL DAILY
Qty: 135 TABLET | Refills: 1 | Status: SHIPPED | OUTPATIENT
Start: 2023-06-26

## 2023-06-26 RX ORDER — GLIPIZIDE 5 MG/1
TABLET ORAL
Qty: 180 TABLET | Refills: 1 | Status: SHIPPED | OUTPATIENT
Start: 2023-06-26

## 2023-06-26 ASSESSMENT — PATIENT HEALTH QUESTIONNAIRE - PHQ9
SUM OF ALL RESPONSES TO PHQ QUESTIONS 1-9: 18
SUM OF ALL RESPONSES TO PHQ QUESTIONS 1-9: 18
7. TROUBLE CONCENTRATING ON THINGS, SUCH AS READING THE NEWSPAPER OR WATCHING TELEVISION: 3
5. POOR APPETITE OR OVEREATING: 3
2. FEELING DOWN, DEPRESSED OR HOPELESS: 3
4. FEELING TIRED OR HAVING LITTLE ENERGY: 3
SUM OF ALL RESPONSES TO PHQ9 QUESTIONS 1 & 2: 6
3. TROUBLE FALLING OR STAYING ASLEEP: 3
SUM OF ALL RESPONSES TO PHQ QUESTIONS 1-9: 18
1. LITTLE INTEREST OR PLEASURE IN DOING THINGS: 3
SUM OF ALL RESPONSES TO PHQ QUESTIONS 1-9: 18
10. IF YOU CHECKED OFF ANY PROBLEMS, HOW DIFFICULT HAVE THESE PROBLEMS MADE IT FOR YOU TO DO YOUR WORK, TAKE CARE OF THINGS AT HOME, OR GET ALONG WITH OTHER PEOPLE: 0
9. THOUGHTS THAT YOU WOULD BE BETTER OFF DEAD, OR OF HURTING YOURSELF: 0
6. FEELING BAD ABOUT YOURSELF - OR THAT YOU ARE A FAILURE OR HAVE LET YOURSELF OR YOUR FAMILY DOWN: 0
8. MOVING OR SPEAKING SO SLOWLY THAT OTHER PEOPLE COULD HAVE NOTICED. OR THE OPPOSITE, BEING SO FIGETY OR RESTLESS THAT YOU HAVE BEEN MOVING AROUND A LOT MORE THAN USUAL: 0

## 2023-07-17 ENCOUNTER — OFFICE VISIT (OUTPATIENT)
Facility: CLINIC | Age: 63
End: 2023-07-17
Payer: COMMERCIAL

## 2023-07-17 VITALS
OXYGEN SATURATION: 95 % | RESPIRATION RATE: 16 BRPM | DIASTOLIC BLOOD PRESSURE: 75 MMHG | TEMPERATURE: 97.1 F | HEART RATE: 70 BPM | WEIGHT: 199.8 LBS | SYSTOLIC BLOOD PRESSURE: 120 MMHG | BODY MASS INDEX: 30.28 KG/M2 | HEIGHT: 68 IN

## 2023-07-17 DIAGNOSIS — L92.0 GRANULOMA ANNULARE: ICD-10-CM

## 2023-07-17 DIAGNOSIS — N39.0 E. COLI UTI: ICD-10-CM

## 2023-07-17 DIAGNOSIS — Z00.00 ANNUAL PHYSICAL EXAM: ICD-10-CM

## 2023-07-17 DIAGNOSIS — Z86.19 HISTORY OF HEPATITIS C: ICD-10-CM

## 2023-07-17 DIAGNOSIS — E11.21 TYPE 2 DIABETES WITH NEPHROPATHY (HCC): Primary | ICD-10-CM

## 2023-07-17 DIAGNOSIS — E78.00 PURE HYPERCHOLESTEROLEMIA: ICD-10-CM

## 2023-07-17 DIAGNOSIS — F32.A DEPRESSION, UNSPECIFIED DEPRESSION TYPE: ICD-10-CM

## 2023-07-17 DIAGNOSIS — Z00.00 PREVENTATIVE HEALTH CARE: ICD-10-CM

## 2023-07-17 DIAGNOSIS — B96.20 E. COLI UTI: ICD-10-CM

## 2023-07-17 PROCEDURE — 3051F HG A1C>EQUAL 7.0%<8.0%: CPT | Performed by: NURSE PRACTITIONER

## 2023-07-17 PROCEDURE — 99214 OFFICE O/P EST MOD 30 MIN: CPT | Performed by: NURSE PRACTITIONER

## 2023-07-17 RX ORDER — SULFAMETHOXAZOLE AND TRIMETHOPRIM 400; 80 MG/1; MG/1
1 TABLET ORAL DAILY
Qty: 5 TABLET | Refills: 0 | Status: SHIPPED | OUTPATIENT
Start: 2023-07-17 | End: 2023-07-22

## 2023-07-17 RX ORDER — DULAGLUTIDE 0.75 MG/.5ML
0.75 INJECTION, SOLUTION SUBCUTANEOUS WEEKLY
Qty: 4 ADJUSTABLE DOSE PRE-FILLED PEN SYRINGE | Refills: 1 | Status: SHIPPED | OUTPATIENT
Start: 2023-07-17

## 2023-07-17 RX ORDER — TRIAMCINOLONE ACETONIDE 1 MG/G
CREAM TOPICAL 2 TIMES DAILY
Qty: 453.6 G | Refills: 3 | Status: SHIPPED | OUTPATIENT
Start: 2023-07-17

## 2023-07-17 RX ORDER — ATORVASTATIN CALCIUM 20 MG/1
10 TABLET, FILM COATED ORAL DAILY
Qty: 90 TABLET | Refills: 3 | Status: SHIPPED | OUTPATIENT
Start: 2023-07-17

## 2023-07-17 ASSESSMENT — VISUAL ACUITY
OS_CC: 20/13
OD_CC: 20/40

## 2023-07-17 NOTE — PROGRESS NOTES
Johnie Sandoval is a 57 y.o. female  established patient, here for evaluation of the following chief complaint(s):  Chief Complaint   Patient presents with    Annual Exam     Physical     Discuss Labs      Assessment and Plan  1. Type 2 diabetes with nephropathy (HCC)  -     Dulaglutide (TRULICITY) 7.12 CO/0.9NE SOPN; Inject 0.75 mg into the skin once a week, Disp-4 Adjustable Dose Pre-filled Pen Syringe, R-1Normal  2. Pure hypercholesterolemia  -     atorvastatin (LIPITOR) 20 MG tablet; Take 0.5 tablets by mouth daily, Disp-90 tablet, R-3Normal  3. Preventative health care  -     atorvastatin (LIPITOR) 20 MG tablet; Take 0.5 tablets by mouth daily, Disp-90 tablet, R-3Normal  4. E. coli UTI  -     sulfamethoxazole-trimethoprim (BACTRIM) 400-80 MG per tablet; Take 1 tablet by mouth daily for 5 days, Disp-5 tablet, R-0Normal  5. Granuloma annulare  -     triamcinolone (KENALOG) 0.1 % cream; Apply topically 2 times daily, Topical, 2 TIMES DAILY Starting Mon 7/17/2023, Disp-453.6 g, R-3, Normal  6. Annual physical exam  7. History of hepatitis C  8. Depression, unspecified depression type     Return in about 3 months (around 10/17/2023) for Diabetes and depression, 20. HPI:   In office visit. Pt appears well and says she feels well. T2D, a1c 7, taking medications as directed, she watching her diet    Increase Lipitor 20 mg daily     Advised needs diabetic eye exam, she says she has a cataract but cannot afford the surgery. Pt is getting Medicaid soon.      Pt has a HX of chronic hep C, not active     HX of depression and on Zoloft w/ good results     ROS:    General: negative for - chills, fever, weight changes or malaise  HEENT: no sore throat, nasal congestion, vision problems or ear problems  Respiratory: no cough, shortness of breath, or wheezing  Cardiovascular: no chest pain, palpitations, or dyspnea on exertion  Gastrointestinal: no abdominal pain, N/V, change in bowel habits  Musculoskeletal: no back pain or

## 2023-07-19 LAB — NONINV COLON CA DNA+OCC BLD SCRN STL QL: NEGATIVE

## 2023-07-31 ENCOUNTER — TELEPHONE (OUTPATIENT)
Facility: CLINIC | Age: 63
End: 2023-07-31

## 2023-07-31 NOTE — TELEPHONE ENCOUNTER
Pt called states that the Trulicity is making her feel bad and she will no longer be taking the medication.

## 2023-09-27 ENCOUNTER — TELEPHONE (OUTPATIENT)
Facility: CLINIC | Age: 63
End: 2023-09-27

## 2023-09-27 DIAGNOSIS — Z00.00 PREVENTATIVE HEALTH CARE: ICD-10-CM

## 2023-09-27 DIAGNOSIS — E78.00 PURE HYPERCHOLESTEROLEMIA: ICD-10-CM

## 2023-09-27 RX ORDER — ATORVASTATIN CALCIUM 20 MG/1
20 TABLET, FILM COATED ORAL DAILY
Qty: 90 TABLET | Refills: 3 | Status: SHIPPED | OUTPATIENT
Start: 2023-09-27

## 2023-09-27 NOTE — TELEPHONE ENCOUNTER
Patient is requesting a refill due to incorrect dosage per patient. atorvastatin (LIPITOR) 20 MG tablet [[id number]    She is stating that the instructions were not clear and she is concern.      Please advise    Thank you

## 2023-10-16 ENCOUNTER — OFFICE VISIT (OUTPATIENT)
Facility: CLINIC | Age: 63
End: 2023-10-16
Payer: MEDICARE

## 2023-10-16 VITALS
OXYGEN SATURATION: 95 % | HEART RATE: 71 BPM | DIASTOLIC BLOOD PRESSURE: 78 MMHG | TEMPERATURE: 97.7 F | SYSTOLIC BLOOD PRESSURE: 117 MMHG | HEIGHT: 68 IN | RESPIRATION RATE: 16 BRPM | WEIGHT: 195 LBS | BODY MASS INDEX: 29.55 KG/M2

## 2023-10-16 DIAGNOSIS — R06.02 SOB (SHORTNESS OF BREATH): ICD-10-CM

## 2023-10-16 DIAGNOSIS — Z91.81 AT HIGH RISK FOR FALLS: ICD-10-CM

## 2023-10-16 DIAGNOSIS — U09.9 COVID-19 LONG HAULER: ICD-10-CM

## 2023-10-16 DIAGNOSIS — Z23 IMMUNIZATION DUE: Primary | ICD-10-CM

## 2023-10-16 PROCEDURE — G8427 DOCREV CUR MEDS BY ELIG CLIN: HCPCS | Performed by: NURSE PRACTITIONER

## 2023-10-16 PROCEDURE — 99214 OFFICE O/P EST MOD 30 MIN: CPT | Performed by: NURSE PRACTITIONER

## 2023-10-16 PROCEDURE — 90677 PCV20 VACCINE IM: CPT | Performed by: NURSE PRACTITIONER

## 2023-10-16 PROCEDURE — G8399 PT W/DXA RESULTS DOCUMENT: HCPCS | Performed by: NURSE PRACTITIONER

## 2023-10-16 PROCEDURE — G8484 FLU IMMUNIZE NO ADMIN: HCPCS | Performed by: NURSE PRACTITIONER

## 2023-10-16 PROCEDURE — 93000 ELECTROCARDIOGRAM COMPLETE: CPT | Performed by: NURSE PRACTITIONER

## 2023-10-16 PROCEDURE — G0009 ADMIN PNEUMOCOCCAL VACCINE: HCPCS | Performed by: NURSE PRACTITIONER

## 2023-10-16 PROCEDURE — 1090F PRES/ABSN URINE INCON ASSESS: CPT | Performed by: NURSE PRACTITIONER

## 2023-10-16 PROCEDURE — 3017F COLORECTAL CA SCREEN DOC REV: CPT | Performed by: NURSE PRACTITIONER

## 2023-10-16 PROCEDURE — 1123F ACP DISCUSS/DSCN MKR DOCD: CPT | Performed by: NURSE PRACTITIONER

## 2023-10-16 PROCEDURE — 1036F TOBACCO NON-USER: CPT | Performed by: NURSE PRACTITIONER

## 2023-10-16 PROCEDURE — G8417 CALC BMI ABV UP PARAM F/U: HCPCS | Performed by: NURSE PRACTITIONER

## 2023-10-16 RX ORDER — ALBUTEROL SULFATE 90 UG/1
2 AEROSOL, METERED RESPIRATORY (INHALATION) 4 TIMES DAILY PRN
Qty: 54 G | Refills: 5 | Status: SHIPPED | OUTPATIENT
Start: 2023-10-16

## 2023-10-16 NOTE — PROGRESS NOTES
Orin Figueroa is a 70 y.o. female  established patient, here for evaluation of the following chief complaint(s):  Chief Complaint   Patient presents with    Follow-up     3 Month F/U     Congestion      Assessment and Plan  1. Immunization due  -     Pneumococcal, PCV20, PREVNAR 21, (age 6w+), IM, PF  2. At high risk for falls  3. SOB (shortness of breath)  -     albuterol sulfate HFA (VENTOLIN HFA) 108 (90 Base) MCG/ACT inhaler; Inhale 2 puffs into the lungs 4 times daily as needed for Wheezing, Disp-54 g, R-5Normal  -     Pulmonary function test; Future  -     EKG 12 Lead  4. COVID-19 long hauler  -     albuterol sulfate HFA (VENTOLIN HFA) 108 (90 Base) MCG/ACT inhaler; Inhale 2 puffs into the lungs 4 times daily as needed for Wheezing, Disp-54 g, R-5Normal  -     Pulmonary function test; Future     Return in about 3 months (around 1/16/2024). HPI:   In office visit. Pt appears well. She thinks she had chest congestion at times related to long haul covid. She use Vicks rub at night. She has wheezing at times w/ chest heaviness and tightness since 2020 after having covid. She denies chest pain, fainting or a H/O HTN.    T2D, taking medications as directed, Trulicity made her too nausea   6/26/2023 A1C 7. On metformin and glipizide     No change of EKG from previous EKG in 2018  ROS:    General: negative for - chills, fever, weight changes or malaise  HEENT: no sore throat, nasal congestion, vision problems or ear problems  Respiratory: no cough, shortness of breath, or wheezing  Cardiovascular: no chest pain, palpitations, or dyspnea on exertion  Gastrointestinal: no abdominal pain, N/V, change in bowel habits  Musculoskeletal: no back pain or joint pain  Neurological: no headache or dizziness  Endo:  No polyuria or polydipsia  : no urinary  Skin: none  Psychological: negative for - anxiety, depression, sleeps issues    Prior to Admission medications    Medication Sig Start Date End Date Taking?  Authorizing

## 2023-11-16 DIAGNOSIS — F32.A DEPRESSION, UNSPECIFIED DEPRESSION TYPE: ICD-10-CM

## 2023-11-16 DIAGNOSIS — E11.21 TYPE 2 DIABETES WITH NEPHROPATHY (HCC): ICD-10-CM

## 2023-11-16 DIAGNOSIS — F41.9 ANXIETY DISORDER, UNSPECIFIED TYPE: ICD-10-CM

## 2023-11-16 DIAGNOSIS — E11.22 TYPE 2 DIABETES MELLITUS WITH CHRONIC KIDNEY DISEASE, WITHOUT LONG-TERM CURRENT USE OF INSULIN, UNSPECIFIED CKD STAGE (HCC): ICD-10-CM

## 2023-11-16 NOTE — TELEPHONE ENCOUNTER
This patient contacted the office for the following prescriptions to be refilled:    Medication requested :   Requested Prescriptions     Pending Prescriptions Disp Refills    glipiZIDE (GLUCOTROL) 5 MG tablet 180 tablet 1     Sig: TAKE 1 TABLET BY MOUTH TWICE A DAYTAKE 1 TABLET BY MOUTH TWICE A DAY    metFORMIN (GLUCOPHAGE) 1000 MG tablet 180 tablet 1     Sig: Take 1 tablet by mouth 2 times daily (with meals)    sertraline (ZOLOFT) 100 MG tablet 135 tablet 1     Sig: Take 1.5 tablets by mouth daily      PCP: KATY Orona CNP  LOV:           10/2023 an IN OFFICE  NOV DMA: 1/17/2024  FUTURE APPT:   Future Appointments   Date Time Provider [address] [place] [address]   1/17/2024  9:40 AM KATY Orona CNP, DMA BS AMB         Thank you.

## 2023-11-19 RX ORDER — GLIPIZIDE 5 MG/1
TABLET ORAL
Qty: 180 TABLET | Refills: 1 | Status: SHIPPED | OUTPATIENT
Start: 2023-11-19

## 2023-11-19 RX ORDER — SERTRALINE HYDROCHLORIDE 100 MG/1
150 TABLET, FILM COATED ORAL DAILY
Qty: 135 TABLET | Refills: 1 | Status: SHIPPED | OUTPATIENT
Start: 2023-11-19

## 2023-12-27 DIAGNOSIS — F41.9 ANXIETY DISORDER, UNSPECIFIED TYPE: ICD-10-CM

## 2023-12-27 DIAGNOSIS — F32.A DEPRESSION, UNSPECIFIED DEPRESSION TYPE: ICD-10-CM

## 2023-12-27 NOTE — TELEPHONE ENCOUNTER
This patient contacted the office for the following prescriptions to be refilled:    Medication requested :   Requested Prescriptions     Pending Prescriptions Disp Refills    sertraline (ZOLOFT) 100 MG tablet 135 tablet 1     Sig: Take 1.5 tablets by mouth daily      PCP: KATY Mendoza CNP  LOV:           10/2023 an IN OFFICE  NOV DMA: 1/17/2024  FUTURE APPT:   Future Appointments   Date Time Provider [address] [place] [address]   1/17/2024  9:40 AM KATY Mendoza CNP, DMA BS AMB         Thank you.

## 2023-12-31 RX ORDER — SERTRALINE HYDROCHLORIDE 100 MG/1
150 TABLET, FILM COATED ORAL DAILY
Qty: 135 TABLET | Refills: 1 | Status: SHIPPED | OUTPATIENT
Start: 2023-12-31

## 2024-01-16 NOTE — PROGRESS NOTES
Jennifer Mae is a 63 y.o. female  established patient, here for evaluation of the following chief complaint(s):  Chief Complaint   Patient presents with    3 Month Follow-Up      Assessment and Plan  1. Type 2 diabetes mellitus with chronic kidney disease, without long-term current use of insulin, unspecified CKD stage (HCC)  -     AMB POC HEMOGLOBIN A1C  -     metFORMIN (GLUCOPHAGE) 1000 MG tablet; Take 1 tablet by mouth 2 times daily (with meals), Disp-180 tablet, R-1Normal  -     Microalbumin / Creatinine Urine Ratio; Future  -      DIABETES FOOT EXAM  2. Depression, unspecified depression type  3. Anxiety disorder, unspecified type  4. Pure hypercholesterolemia  5. Type 2 diabetes with nephropathy ([facility])  -     glipiZIDE (GLUCOTROL) 5 MG tablet; TAKE 1 TABLET BY MOUTH TWICE A DAYTAKE 1 TABLET BY MOUTH TWICE A DAY, Disp-180 tablet, R-1Normal  6. Dysuria  -     AMB POC URINALYSIS DIP STICK AUTO W/ MICRO  -     Culture, Urine; Future  -     phenazopyridine (PYRIDIUM) 200 MG tablet; Take 1 tablet by mouth 3 times daily as needed for Pain, Disp-6 tablet, R-0Normal  7. Age-related cataract of both eyes, unspecified age-related cataract type  -     External Referral To Ophthalmology  8. DM type 2, goal HbA1c < 7.5% ([facility])  9. E. coli UTI  -     cephALEXin (KEFLEX) 500 MG capsule; Take 1 capsule by mouth 2 times daily for 7 days, Disp-14 capsule, R-0Normal  -     phenazopyridine (PYRIDIUM) 200 MG tablet; Take 1 tablet by mouth 3 times daily as needed for Pain, Disp-6 tablet, R-0Normal     Return in about 3 months (around 4/17/2024) for Pap Smear, 30.   HPI:   In office visit. Pt appears well.     -pt has dysuria for 3 days     -pt was sick w/ URI before Christmas. She went to urgent care.  She is feeling better    -pt had PFT before Christmas 2023, don't see the results.  Nursing staff will work on getting his results    -pt needs a referral for cataracts     -T2D, taking medications as directed, Trulicity made her too

## 2024-01-17 ENCOUNTER — OFFICE VISIT (OUTPATIENT)
Facility: CLINIC | Age: 64
End: 2024-01-17
Payer: MEDICARE

## 2024-01-17 VITALS
HEIGHT: 68 IN | OXYGEN SATURATION: 92 % | SYSTOLIC BLOOD PRESSURE: 128 MMHG | HEART RATE: 86 BPM | RESPIRATION RATE: 20 BRPM | WEIGHT: 191 LBS | TEMPERATURE: 98.3 F | DIASTOLIC BLOOD PRESSURE: 79 MMHG | BODY MASS INDEX: 28.95 KG/M2

## 2024-01-17 DIAGNOSIS — E11.21 TYPE 2 DIABETES WITH NEPHROPATHY (HCC): ICD-10-CM

## 2024-01-17 DIAGNOSIS — E78.00 PURE HYPERCHOLESTEROLEMIA: ICD-10-CM

## 2024-01-17 DIAGNOSIS — F41.9 ANXIETY DISORDER, UNSPECIFIED TYPE: ICD-10-CM

## 2024-01-17 DIAGNOSIS — N39.0 E. COLI UTI: ICD-10-CM

## 2024-01-17 DIAGNOSIS — E11.9 DM TYPE 2, GOAL HBA1C < 7.5% (HCC): ICD-10-CM

## 2024-01-17 DIAGNOSIS — F32.A DEPRESSION, UNSPECIFIED DEPRESSION TYPE: ICD-10-CM

## 2024-01-17 DIAGNOSIS — H25.9 AGE-RELATED CATARACT OF BOTH EYES, UNSPECIFIED AGE-RELATED CATARACT TYPE: ICD-10-CM

## 2024-01-17 DIAGNOSIS — B96.20 E. COLI UTI: ICD-10-CM

## 2024-01-17 DIAGNOSIS — E11.22 TYPE 2 DIABETES MELLITUS WITH CHRONIC KIDNEY DISEASE, WITHOUT LONG-TERM CURRENT USE OF INSULIN, UNSPECIFIED CKD STAGE (HCC): Primary | ICD-10-CM

## 2024-01-17 DIAGNOSIS — R30.0 DYSURIA: ICD-10-CM

## 2024-01-17 LAB
BILIRUBIN, URINE, POC: NEGATIVE
BLOOD URINE, POC: ABNORMAL
GLUCOSE URINE, POC: ABNORMAL
HBA1C MFR BLD: 7.5 %
KETONES, URINE, POC: NEGATIVE
LEUKOCYTE ESTERASE, URINE, POC: ABNORMAL
NITRITE, URINE, POC: POSITIVE
PH, URINE, POC: 5.5 (ref 4.6–8)
PROTEIN,URINE, POC: NEGATIVE
SPECIFIC GRAVITY, URINE, POC: 1.01 (ref 1–1.03)
URINALYSIS CLARITY, POC: ABNORMAL
URINALYSIS COLOR, POC: ABNORMAL
UROBILINOGEN, POC: NORMAL

## 2024-01-17 PROCEDURE — 1123F ACP DISCUSS/DSCN MKR DOCD: CPT | Performed by: NURSE PRACTITIONER

## 2024-01-17 PROCEDURE — 99214 OFFICE O/P EST MOD 30 MIN: CPT | Performed by: NURSE PRACTITIONER

## 2024-01-17 PROCEDURE — 83036 HEMOGLOBIN GLYCOSYLATED A1C: CPT | Performed by: NURSE PRACTITIONER

## 2024-01-17 PROCEDURE — 81001 URINALYSIS AUTO W/SCOPE: CPT | Performed by: NURSE PRACTITIONER

## 2024-01-17 RX ORDER — CEPHALEXIN 500 MG/1
500 CAPSULE ORAL 2 TIMES DAILY
Qty: 14 CAPSULE | Refills: 0 | Status: SHIPPED | OUTPATIENT
Start: 2024-01-17 | End: 2024-01-24

## 2024-01-17 RX ORDER — PHENAZOPYRIDINE HYDROCHLORIDE 200 MG/1
200 TABLET, FILM COATED ORAL 3 TIMES DAILY PRN
Qty: 6 TABLET | Refills: 0 | Status: SHIPPED | OUTPATIENT
Start: 2024-01-17 | End: 2024-01-19

## 2024-01-17 RX ORDER — GLIPIZIDE 5 MG/1
TABLET ORAL
Qty: 180 TABLET | Refills: 1 | Status: SHIPPED | OUTPATIENT
Start: 2024-01-17

## 2024-01-17 ASSESSMENT — PATIENT HEALTH QUESTIONNAIRE - PHQ9: DEPRESSION UNABLE TO ASSESS: PT REFUSES

## 2024-01-17 NOTE — PATIENT INSTRUCTIONS
Your diabetes is not controlled. Cut back on meat, dairy, progressed foods, fast food, carbohydrates, juice, sugary drinks, candy and sweets.

## 2024-01-18 LAB
ALBUMIN/CREAT UR: 91 MG/G CREAT (ref 0–29)
CREAT UR-MCNC: 61.4 MG/DL
MICROALBUMIN UR-MCNC: 56.1 UG/ML

## 2024-01-20 LAB — BACTERIA UR CULT: ABNORMAL

## 2024-01-30 ENCOUNTER — TELEPHONE (OUTPATIENT)
Facility: CLINIC | Age: 64
End: 2024-01-30

## 2024-01-30 NOTE — TELEPHONE ENCOUNTER
----- Message from Sury Greenberg sent at 1/17/2024  1:16 PM EST -----  Subject: Message to Provider    QUESTIONS  Information for Provider? Pt has info test was done 11/01/23 acct #   [id number] this for April who has been speaking to the pt  ---------------------------------------------------------------------------  --------------  CALL BACK INFO  [phone]; OK to leave message on voicemail  ---------------------------------------------------------------------------  --------------  SCRIPT ANSWERS  undefined

## 2024-02-02 DIAGNOSIS — R06.02 SOB (SHORTNESS OF BREATH): ICD-10-CM

## 2024-02-02 DIAGNOSIS — U09.9 COVID-19 LONG HAULER: Primary | ICD-10-CM

## 2024-02-02 NOTE — PROGRESS NOTES
[facility] does not have patient's results related to pulmonary function test.  I have reordered it and will be faxed to [facility].  Our staff checked MD Office and it says the pulmonary function test was canceled.

## 2024-04-17 ENCOUNTER — OFFICE VISIT (OUTPATIENT)
Facility: CLINIC | Age: 64
End: 2024-04-17

## 2024-04-17 VITALS
BODY MASS INDEX: 29.4 KG/M2 | HEART RATE: 78 BPM | OXYGEN SATURATION: 95 % | TEMPERATURE: 97.4 F | DIASTOLIC BLOOD PRESSURE: 63 MMHG | SYSTOLIC BLOOD PRESSURE: 128 MMHG | RESPIRATION RATE: 17 BRPM | WEIGHT: 194 LBS | HEIGHT: 68 IN

## 2024-04-17 DIAGNOSIS — Z00.00 MEDICARE ANNUAL WELLNESS VISIT, SUBSEQUENT: ICD-10-CM

## 2024-04-17 DIAGNOSIS — F41.9 ANXIETY AND DEPRESSION: ICD-10-CM

## 2024-04-17 DIAGNOSIS — Z12.4 ENCOUNTER FOR PAPANICOLAOU SMEAR FOR CERVICAL CANCER SCREENING: ICD-10-CM

## 2024-04-17 DIAGNOSIS — E11.22 TYPE 2 DIABETES MELLITUS WITH CHRONIC KIDNEY DISEASE, WITHOUT LONG-TERM CURRENT USE OF INSULIN, UNSPECIFIED CKD STAGE (HCC): ICD-10-CM

## 2024-04-17 DIAGNOSIS — E11.21 TYPE 2 DIABETES WITH NEPHROPATHY (HCC): ICD-10-CM

## 2024-04-17 DIAGNOSIS — Z00.00 PREVENTATIVE HEALTH CARE: ICD-10-CM

## 2024-04-17 DIAGNOSIS — F32.A ANXIETY AND DEPRESSION: ICD-10-CM

## 2024-04-17 DIAGNOSIS — B18.2 CHRONIC HEPATITIS C WITHOUT HEPATIC COMA (HCC): ICD-10-CM

## 2024-04-17 DIAGNOSIS — E11.9 DM TYPE 2, GOAL HBA1C < 7.5% (HCC): Primary | ICD-10-CM

## 2024-04-17 DIAGNOSIS — Z71.89 ACP (ADVANCE CARE PLANNING): ICD-10-CM

## 2024-04-17 DIAGNOSIS — E78.00 PURE HYPERCHOLESTEROLEMIA: ICD-10-CM

## 2024-04-17 LAB — HBA1C MFR BLD: 7.6 %

## 2024-04-17 RX ORDER — SERTRALINE HYDROCHLORIDE 100 MG/1
150 TABLET, FILM COATED ORAL DAILY
Qty: 135 TABLET | Refills: 1 | Status: SHIPPED | OUTPATIENT
Start: 2024-04-17

## 2024-04-17 RX ORDER — ATORVASTATIN CALCIUM 20 MG/1
20 TABLET, FILM COATED ORAL DAILY
Qty: 90 TABLET | Refills: 1 | Status: SHIPPED | OUTPATIENT
Start: 2024-04-17

## 2024-04-17 RX ORDER — GLIPIZIDE 5 MG/1
TABLET ORAL
Qty: 180 TABLET | Refills: 1 | Status: SHIPPED | OUTPATIENT
Start: 2024-04-17

## 2024-04-17 ASSESSMENT — PATIENT HEALTH QUESTIONNAIRE - PHQ9
9. THOUGHTS THAT YOU WOULD BE BETTER OFF DEAD, OR OF HURTING YOURSELF: NOT AT ALL
SUM OF ALL RESPONSES TO PHQ QUESTIONS 1-9: 10
8. MOVING OR SPEAKING SO SLOWLY THAT OTHER PEOPLE COULD HAVE NOTICED. OR THE OPPOSITE, BEING SO FIGETY OR RESTLESS THAT YOU HAVE BEEN MOVING AROUND A LOT MORE THAN USUAL: NOT AT ALL
4. FEELING TIRED OR HAVING LITTLE ENERGY: NEARLY EVERY DAY
6. FEELING BAD ABOUT YOURSELF - OR THAT YOU ARE A FAILURE OR HAVE LET YOURSELF OR YOUR FAMILY DOWN: NOT AT ALL
2. FEELING DOWN, DEPRESSED OR HOPELESS: SEVERAL DAYS
7. TROUBLE CONCENTRATING ON THINGS, SUCH AS READING THE NEWSPAPER OR WATCHING TELEVISION: NEARLY EVERY DAY
5. POOR APPETITE OR OVEREATING: NOT AT ALL
SUM OF ALL RESPONSES TO PHQ QUESTIONS 1-9: 10
3. TROUBLE FALLING OR STAYING ASLEEP: NEARLY EVERY DAY

## 2024-04-17 NOTE — PATIENT INSTRUCTIONS
of pain reliever, such as acetaminophen (Tylenol).   When should you call for help?   Call 911 if you have symptoms of a heart attack. These may include:    Chest pain or pressure, or a strange feeling in the chest.     Sweating.     Shortness of breath.     Pain, pressure, or a strange feeling in the back, neck, jaw, or upper belly or in one or both shoulders or arms.     Lightheadedness or sudden weakness.     A fast or irregular heartbeat.   After you call 911, the  may tell you to chew 1 adult-strength or 2 to 4 low-dose aspirin. Wait for an ambulance. Do not try to drive yourself.  Watch closely for changes in your health, and be sure to contact your doctor if you have any problems.  Where can you learn more?  Go to https://www.[employer].net/patientEd and enter F075 to learn more about \"A Healthy Heart: Care Instructions.\"  Current as of: June 24, 2023               Content Version: 14.0  © 0847-9151 [employer].   Care instructions adapted under license by [employer]. If you have questions about a medical condition or this instruction, always ask your healthcare professional. [employer] disclaims any warranty or liability for your use of this information.      Personalized Preventive Plan for Jennifer Mae - 4/17/2024  Medicare offers a range of preventive health benefits. Some of the tests and screenings are paid in full while other may be subject to a deductible, co-insurance, and/or copay.    Some of these benefits include a comprehensive review of your medical history including lifestyle, illnesses that may run in your family, and various assessments and screenings as appropriate.    After reviewing your medical record and screening and assessments performed today your provider may have ordered immunizations, labs, imaging, and/or referrals for you.  A list of these orders (if applicable) as well as your Preventive Care list are included within your After Visit Summary

## 2024-04-17 NOTE — PROGRESS NOTES
Jennifer Mae is a 63 y.o. year old female who presents today for   Chief Complaint   Patient presents with    Follow-up    Gynecologic Exam       Is someone accompanying this pt? no    Is the patient using any DME equipment during OV? no    Depression Screenin/17/2024     9:16 AM 2023    11:17 AM 2022     8:21 AM   PHQ-9 Questionaire   Little interest or pleasure in doing things  3 0   Feeling down, depressed, or hopeless 1 3 3   Trouble falling or staying asleep, or sleeping too much 3 3 3   Feeling tired or having little energy 3 3 3   Poor appetite or overeating 0 3 3   Feeling bad about yourself - or that you are a failure or have let yourself or your family down 0 0 0   Trouble concentrating on things, such as reading the newspaper or watching television 3 3 3   Moving or speaking so slowly that other people could have noticed. Or the opposite - being so fidgety or restless that you have been moving around a lot more than usual 0 0 0   Thoughts that you would be better off dead, or of hurting yourself in some way 0 0    PHQ-9 Total Score 10 18 15   If you checked off any problems, how difficult have these problems made it for you to do your work, take care of things at home, or get along with other people?  0        Abuse Screening:       No data to display                Learning Assessment:  No question data found.    Fall Risk:      2024     9:49 AM 10/16/2023     9:45 AM   Fall Risk   2 or more falls in past year? yes yes   Fall with injury in past year? no no           Coordination of Care:   1. \"Have you been to the ER, urgent care clinic since your last visit?  Hospitalized since your last visit?\" no    2. \"Have you seen or consulted any other health care providers outside of the [facility] since your last visit?\" yes    3. For patients aged 45-75: Has the patient had a colonoscopy / FIT/ Cologuard? yes    If the patient is female:    4. For patients aged 40-74:

## 2024-04-17 NOTE — PROGRESS NOTES
Jennifer Mae is a 63 y.o. female  established patient, here for evaluation of the following chief complaint(s):  Chief Complaint   Patient presents with    Follow-up    Gynecologic Exam    Medicare AW      Assessment and Plan  1. DM type 2, goal HbA1c < 7.5% (HCC)  Comments:  today a1c 7.6, no changes, work on diet  Orders:  -     AMB POC HEMOGLOBIN A1C  2. Chronic hepatitis C without hepatic coma (HCC)  Comments:  saw GI, found to be non active  3. Pure hypercholesterolemia  Comments:  on lipitor, no changes  Orders:  -     atorvastatin (LIPITOR) 20 MG tablet; Take 1 tablet by mouth daily, Disp-90 tablet, R-1Correction pt take 1 tab dailyNormal  4. Type 2 diabetes with nephropathy (HCC)  -     Renal Function Panel; Future  -     glipiZIDE (GLUCOTROL) 5 MG tablet; TAKE 1 TABLET BY MOUTH TWICE A DAYTAKE 1 TABLET BY MOUTH TWICE A DAY, Disp-180 tablet, R-1Normal  5. Anxiety and depression  Comments:  on Zoloft, no chnages  Orders:  -     sertraline (ZOLOFT) 100 MG tablet; Take 1.5 tablets by mouth daily, Disp-135 tablet, R-1Normal  6. Encounter for Papanicolaou smear for cervical cancer screening  -     PAP IG, CT-NG-TV, Aptima HPV and rfx 16/18,45 (199315); Future  7. ACP (advance care planning)  -     FULL CODE  8. Medicare annual wellness visit, subsequent  9. Type 2 diabetes mellitus with chronic kidney disease, without long-term current use of insulin, unspecified CKD stage (HCC)  -     metFORMIN (GLUCOPHAGE) 1000 MG tablet; Take 1 tablet by mouth 2 times daily (with meals), Disp-180 tablet, R-1Normal  10. Preventative health care  -     atorvastatin (LIPITOR) 20 MG tablet; Take 1 tablet by mouth daily, Disp-90 tablet, R-1Correction pt take 1 tab dailyNormal       No follow-ups on file.   HPI:   In office visit.    Type 2 diabetes  Hyperglycemia, 1/17/2024 A1c 7.5, 4/17/2024 A1c 7.6  Metformin 1000 mg twice daily and glipizide 5 mg twice daily. no change in management.  Continue/increase dietary efforts and

## 2024-04-19 LAB
ALBUMIN SERPL-MCNC: 4.3 G/DL (ref 3.9–4.9)
BUN SERPL-MCNC: 10 MG/DL (ref 8–27)
BUN/CREAT SERPL: 9 (ref 12–28)
CALCIUM SERPL-MCNC: 8.9 MG/DL (ref 8.7–10.3)
CHLORIDE SERPL-SCNC: 101 MMOL/L (ref 96–106)
CO2 SERPL-SCNC: 21 MMOL/L (ref 20–29)
CREAT SERPL-MCNC: 1.11 MG/DL (ref 0.57–1)
EGFRCR SERPLBLD CKD-EPI 2021: 55 ML/MIN/1.73
GLUCOSE SERPL-MCNC: 272 MG/DL (ref 70–99)
PHOSPHATE SERPL-MCNC: 3 MG/DL (ref 3–4.3)
POTASSIUM SERPL-SCNC: 4.5 MMOL/L (ref 3.5–5.2)
SODIUM SERPL-SCNC: 139 MMOL/L (ref 134–144)

## 2024-04-25 LAB
C TRACH RRNA CVX QL NAA+PROBE: NEGATIVE
CYTOLOGIST CVX/VAG CYTO: ABNORMAL
CYTOLOGY CVX/VAG DOC CYTO: ABNORMAL
CYTOLOGY CVX/VAG DOC THIN PREP: ABNORMAL
DX ICD CODE: ABNORMAL
DX ICD CODE: ABNORMAL
HPV GENOTYPE REFLEX: ABNORMAL
HPV I/H RISK 4 DNA CVX QL PROBE+SIG AMP: NEGATIVE
Lab: ABNORMAL
N GONORRHOEA RRNA CVX QL NAA+PROBE: NEGATIVE
OTHER STN SPEC: ABNORMAL
PATHOLOGIST CVX/VAG CYTO: ABNORMAL
STAT OF ADQ CVX/VAG CYTO-IMP: ABNORMAL
T VAGINALIS RRNA SPEC QL NAA+PROBE: NEGATIVE

## 2024-04-26 DIAGNOSIS — R87.612 LGSIL OF CERVIX OF UNDETERMINED SIGNIFICANCE: Primary | ICD-10-CM

## 2024-07-17 ENCOUNTER — OFFICE VISIT (OUTPATIENT)
Facility: CLINIC | Age: 64
End: 2024-07-17
Payer: MEDICARE

## 2024-07-17 VITALS
TEMPERATURE: 98.1 F | HEART RATE: 91 BPM | HEIGHT: 68 IN | SYSTOLIC BLOOD PRESSURE: 106 MMHG | DIASTOLIC BLOOD PRESSURE: 70 MMHG | BODY MASS INDEX: 28.34 KG/M2 | OXYGEN SATURATION: 94 % | WEIGHT: 187 LBS | RESPIRATION RATE: 16 BRPM

## 2024-07-17 DIAGNOSIS — Z13.0 SCREENING FOR DEFICIENCY ANEMIA: ICD-10-CM

## 2024-07-17 DIAGNOSIS — F41.9 ANXIETY: ICD-10-CM

## 2024-07-17 DIAGNOSIS — F32.0 MAJOR DEPRESSIVE DISORDER, SINGLE EPISODE, MILD (HCC): Primary | ICD-10-CM

## 2024-07-17 DIAGNOSIS — K58.2 MIXED IRRITABLE BOWEL SYNDROME: ICD-10-CM

## 2024-07-17 DIAGNOSIS — Z13.228 SCREENING FOR METABOLIC DISORDER: ICD-10-CM

## 2024-07-17 DIAGNOSIS — E11.8 CONTROLLED TYPE 2 DIABETES MELLITUS WITH COMPLICATION, WITHOUT LONG-TERM CURRENT USE OF INSULIN (HCC): ICD-10-CM

## 2024-07-17 DIAGNOSIS — E11.21 TYPE 2 DIABETES WITH NEPHROPATHY (HCC): ICD-10-CM

## 2024-07-17 DIAGNOSIS — E11.22 TYPE 2 DIABETES MELLITUS WITH CHRONIC KIDNEY DISEASE, WITHOUT LONG-TERM CURRENT USE OF INSULIN, UNSPECIFIED CKD STAGE (HCC): ICD-10-CM

## 2024-07-17 DIAGNOSIS — B96.20 E. COLI UTI: ICD-10-CM

## 2024-07-17 DIAGNOSIS — Z13.220 SCREENING FOR CHOLESTEROL LEVEL: ICD-10-CM

## 2024-07-17 DIAGNOSIS — Z13.29 SCREENING FOR THYROID DISORDER: ICD-10-CM

## 2024-07-17 DIAGNOSIS — K31.89 STOMACH SPASM: ICD-10-CM

## 2024-07-17 DIAGNOSIS — Z13.89 SCREENING FOR BLOOD OR PROTEIN IN URINE: ICD-10-CM

## 2024-07-17 DIAGNOSIS — N39.0 E. COLI UTI: ICD-10-CM

## 2024-07-17 DIAGNOSIS — E78.00 PURE HYPERCHOLESTEROLEMIA: ICD-10-CM

## 2024-07-17 DIAGNOSIS — Z00.00 PREVENTATIVE HEALTH CARE: ICD-10-CM

## 2024-07-17 PROCEDURE — 3044F HG A1C LEVEL LT 7.0%: CPT | Performed by: NURSE PRACTITIONER

## 2024-07-17 PROCEDURE — 1123F ACP DISCUSS/DSCN MKR DOCD: CPT | Performed by: NURSE PRACTITIONER

## 2024-07-17 PROCEDURE — 99215 OFFICE O/P EST HI 40 MIN: CPT | Performed by: NURSE PRACTITIONER

## 2024-07-17 RX ORDER — ATORVASTATIN CALCIUM 20 MG/1
20 TABLET, FILM COATED ORAL DAILY
Qty: 90 TABLET | Refills: 1 | Status: SHIPPED | OUTPATIENT
Start: 2024-07-17

## 2024-07-17 RX ORDER — AMITRIPTYLINE HYDROCHLORIDE 50 MG/1
50 TABLET, FILM COATED ORAL NIGHTLY
Qty: 90 TABLET | Refills: 3 | Status: SHIPPED | OUTPATIENT
Start: 2024-07-17

## 2024-07-17 RX ORDER — SERTRALINE HYDROCHLORIDE 100 MG/1
150 TABLET, FILM COATED ORAL DAILY
Qty: 135 TABLET | Refills: 1 | Status: SHIPPED | OUTPATIENT
Start: 2024-07-17 | End: 2024-07-17 | Stop reason: ALTCHOICE

## 2024-07-17 RX ORDER — GLIPIZIDE 5 MG/1
TABLET ORAL
Qty: 180 TABLET | Refills: 1 | Status: SHIPPED | OUTPATIENT
Start: 2024-07-17

## 2024-07-17 NOTE — PROGRESS NOTES
Jennifer Mae is a 63 y.o. year old female who presents today for   Chief Complaint   Patient presents with    3 Month Follow-Up       Is someone accompanying this pt? no    Is the patient using any DME equipment during OV? no    Depression Screenin/17/2024     9:04 AM 2024     9:16 AM 2023    11:17 AM 2022     8:21 AM   PHQ-9 Questionaire   Little interest or pleasure in doing things 0  3 0   Feeling down, depressed, or hopeless 3 1 3 3   Trouble falling or staying asleep, or sleeping too much 3 3 3 3   Feeling tired or having little energy 3 3 3 3   Poor appetite or overeating 0 0 3 3   Feeling bad about yourself - or that you are a failure or have let yourself or your family down 0 0 0 0   Trouble concentrating on things, such as reading the newspaper or watching television 2 3 3 3   Moving or speaking so slowly that other people could have noticed. Or the opposite - being so fidgety or restless that you have been moving around a lot more than usual 0 0 0 0   Thoughts that you would be better off dead, or of hurting yourself in some way 0 0 0    PHQ-9 Total Score 11 10 18 15   If you checked off any problems, how difficult have these problems made it for you to do your work, take care of things at home, or get along with other people? 1  0        Abuse Screening:       No data to display                Learning Assessment:  No question data found.    Fall Risk:      2024     9:49 AM 10/16/2023     9:45 AM   Fall Risk   2 or more falls in past year? yes yes   Fall with injury in past year? no no           Coordination of Care:   1. \"Have you been to the ER, urgent care clinic since your last visit?  Hospitalized since your last visit?\" no    2. \"Have you seen or consulted any other health care providers outside of the [facility] since your last visit?\" no    3. For patients aged 45-75: Has the patient had a colonoscopy / FIT/ Cologuard? yes    If the patient is 
  Result Value Ref Range    Hemoglobin A1C 5.1 4.8 - 5.6 %   Urinalysis with Microscopic   Result Value Ref Range    Specific Gravity, UA 1.015 1.005 - 1.030    pH, Urine 5.0 5.0 - 7.5    Color, UA Yellow Yellow    Appearance Clear Clear    Leukocyte Esterase, Urine 2+ (A) Negative    Protein, UA Negative Negative/Trace    Glucose, Ur 2+ (A) Negative    Ketones, Urine Trace (A) Negative    Blood, Urine Negative Negative    Bilirubin, Urine Negative Negative    Urobilinogen, Urine 0.2 0.2 - 1.0 mg/dL    Nitrite, Urine Positive (A) Negative    Microscopic Examination See additional order    Microscopic Examination   Result Value Ref Range    WBC, UA 11-30 (A) 0 - 5 /hpf    RBC, UA 3-10 (A) 0 - 2 /hpf    Epithelial Cells, UA 0-10 0 - 10 /hpf    Casts UA None seen None seen /lpf    Crystals, UA Present (A) N/A    Crystal Type Calcium Oxalate N/A    Bacteria, UA Many (A) None seen/Few   TSH + Free T4 Panel   Result Value Ref Range    TSH 3.030 0.450 - 4.500 uIU/mL    T4 Free 0.78 (L) 0.82 - 1.77 ng/dL   Comprehensive Metabolic Panel   Result Value Ref Range    Glucose 278 (H) 70 - 99 mg/dL    BUN 14 8 - 27 mg/dL    Creatinine 1.17 (H) 0.57 - 1.00 mg/dL    Est, Glom Filt Rate 52 (L) >59 mL/min/1.73    BUN/Creatinine Ratio 12 12 - 28    Sodium 139 134 - 144 mmol/L    Potassium 4.9 3.5 - 5.2 mmol/L    Chloride 100 96 - 106 mmol/L    CO2 19 (L) 20 - 29 mmol/L    Calcium 9.8 8.7 - 10.3 mg/dL    Total Protein 6.5 6.0 - 8.5 g/dL    Albumin 4.4 3.9 - 4.9 g/dL    Globulin, Total 2.1 1.5 - 4.5 g/dL    Total Bilirubin 0.4 0.0 - 1.2 mg/dL    Alkaline Phosphatase 93 44 - 121 IU/L    AST 20 0 - 40 IU/L    ALT 15 0 - 32 IU/L   Lipid Panel   Result Value Ref Range    Cholesterol, Total 125 100 - 199 mg/dL    Triglycerides 337 (H) 0 - 149 mg/dL    HDL 31 (L) >39 mg/dL    VLDL Cholesterol Calculated 51 (H) 5 - 40 mg/dL    LDL Cholesterol 43 0 - 99 mg/dL        Physical Exam  Patient appears well, she is pleasant, alert, oriented x 3, in

## 2024-07-18 LAB
ALBUMIN SERPL-MCNC: 4.4 G/DL (ref 3.9–4.9)
ALP SERPL-CCNC: 93 IU/L (ref 44–121)
ALT SERPL-CCNC: 15 IU/L (ref 0–32)
APPEARANCE UR: CLEAR
AST SERPL-CCNC: 20 IU/L (ref 0–40)
BACTERIA #/AREA URNS HPF: ABNORMAL /[HPF]
BASOPHILS # BLD AUTO: 0.1 X10E3/UL (ref 0–0.2)
BASOPHILS NFR BLD AUTO: 1 %
BILIRUB SERPL-MCNC: 0.4 MG/DL (ref 0–1.2)
BILIRUB UR QL STRIP: NEGATIVE
BUN SERPL-MCNC: 14 MG/DL (ref 8–27)
BUN/CREAT SERPL: 12 (ref 12–28)
CALCIUM SERPL-MCNC: 9.8 MG/DL (ref 8.7–10.3)
CASTS URNS QL MICRO: ABNORMAL /LPF
CHLORIDE SERPL-SCNC: 100 MMOL/L (ref 96–106)
CHOLEST SERPL-MCNC: 125 MG/DL (ref 100–199)
CO2 SERPL-SCNC: 19 MMOL/L (ref 20–29)
COLOR UR: YELLOW
CREAT SERPL-MCNC: 1.17 MG/DL (ref 0.57–1)
CRYSTALS URNS MICRO: ABNORMAL
EGFRCR SERPLBLD CKD-EPI 2021: 52 ML/MIN/1.73
EOSINOPHIL # BLD AUTO: 0.3 X10E3/UL (ref 0–0.4)
EOSINOPHIL NFR BLD AUTO: 2 %
EPI CELLS #/AREA URNS HPF: ABNORMAL /HPF (ref 0–10)
ERYTHROCYTE [DISTWIDTH] IN BLOOD BY AUTOMATED COUNT: 12.3 % (ref 11.7–15.4)
GLOBULIN SER CALC-MCNC: 2.1 G/DL (ref 1.5–4.5)
GLUCOSE SERPL-MCNC: 278 MG/DL (ref 70–99)
GLUCOSE UR QL STRIP: ABNORMAL
HBA1C MFR BLD: 5.1 % (ref 4.8–5.6)
HCT VFR BLD AUTO: 43.2 % (ref 34–46.6)
HDLC SERPL-MCNC: 31 MG/DL
HGB BLD-MCNC: 13.9 G/DL (ref 11.1–15.9)
HGB UR QL STRIP: NEGATIVE
IMM GRANULOCYTES # BLD AUTO: 0.1 X10E3/UL (ref 0–0.1)
IMM GRANULOCYTES NFR BLD AUTO: 1 %
KETONES UR QL STRIP: ABNORMAL
LDLC SERPL CALC-MCNC: 43 MG/DL (ref 0–99)
LEUKOCYTE ESTERASE UR QL STRIP: ABNORMAL
LYMPHOCYTES # BLD AUTO: 1.9 X10E3/UL (ref 0.7–3.1)
LYMPHOCYTES NFR BLD AUTO: 17 %
MCH RBC QN AUTO: 31 PG (ref 26.6–33)
MCHC RBC AUTO-ENTMCNC: 32.2 G/DL (ref 31.5–35.7)
MCV RBC AUTO: 96 FL (ref 79–97)
MICRO URNS: ABNORMAL
MONOCYTES # BLD AUTO: 0.7 X10E3/UL (ref 0.1–0.9)
MONOCYTES NFR BLD AUTO: 6 %
NEUTROPHILS # BLD AUTO: 8.1 X10E3/UL (ref 1.4–7)
NEUTROPHILS NFR BLD AUTO: 73 %
NITRITE UR QL STRIP: POSITIVE
PH UR STRIP: 5 [PH] (ref 5–7.5)
PLATELET # BLD AUTO: 241 X10E3/UL (ref 150–450)
POTASSIUM SERPL-SCNC: 4.9 MMOL/L (ref 3.5–5.2)
PROT SERPL-MCNC: 6.5 G/DL (ref 6–8.5)
PROT UR QL STRIP: NEGATIVE
RBC # BLD AUTO: 4.49 X10E6/UL (ref 3.77–5.28)
RBC #/AREA URNS HPF: ABNORMAL /HPF (ref 0–2)
SODIUM SERPL-SCNC: 139 MMOL/L (ref 134–144)
SP GR UR STRIP: 1.01 (ref 1–1.03)
T4 FREE SERPL-MCNC: 0.78 NG/DL (ref 0.82–1.77)
TRIGL SERPL-MCNC: 337 MG/DL (ref 0–149)
TSH SERPL DL<=0.005 MIU/L-ACNC: 3.03 UIU/ML (ref 0.45–4.5)
UNIDENT CRYS URNS QL MICRO: PRESENT
UROBILINOGEN UR STRIP-MCNC: 0.2 MG/DL (ref 0.2–1)
VLDLC SERPL CALC-MCNC: 51 MG/DL (ref 5–40)
WBC # BLD AUTO: 11.1 X10E3/UL (ref 3.4–10.8)
WBC #/AREA URNS HPF: ABNORMAL /HPF (ref 0–5)

## 2024-07-22 RX ORDER — DULOXETINE 40 MG/1
40 CAPSULE, DELAYED RELEASE ORAL DAILY
Qty: 30 CAPSULE | Refills: 3 | Status: SHIPPED | OUTPATIENT
Start: 2024-07-22

## 2024-07-22 RX ORDER — CEPHALEXIN 500 MG/1
500 CAPSULE ORAL 2 TIMES DAILY
Qty: 14 CAPSULE | Refills: 0 | Status: SHIPPED | OUTPATIENT
Start: 2024-07-22 | End: 2024-07-29

## 2024-08-15 DIAGNOSIS — F32.0 MAJOR DEPRESSIVE DISORDER, SINGLE EPISODE, MILD (HCC): ICD-10-CM

## 2024-08-15 DIAGNOSIS — F41.9 ANXIETY: ICD-10-CM

## 2024-08-15 RX ORDER — DULOXETINE 40 MG/1
40 CAPSULE, DELAYED RELEASE ORAL DAILY
Qty: 30 CAPSULE | Refills: 3 | Status: SHIPPED | OUTPATIENT
Start: 2024-08-15

## 2024-09-04 ENCOUNTER — OFFICE VISIT (OUTPATIENT)
Facility: CLINIC | Age: 64
End: 2024-09-04

## 2024-09-04 VITALS
HEART RATE: 98 BPM | BODY MASS INDEX: 27.89 KG/M2 | HEIGHT: 68 IN | SYSTOLIC BLOOD PRESSURE: 102 MMHG | DIASTOLIC BLOOD PRESSURE: 68 MMHG | TEMPERATURE: 98.4 F | WEIGHT: 184 LBS | RESPIRATION RATE: 15 BRPM | OXYGEN SATURATION: 98 %

## 2024-09-04 DIAGNOSIS — F32.0 MAJOR DEPRESSIVE DISORDER, SINGLE EPISODE, MILD (HCC): ICD-10-CM

## 2024-09-04 DIAGNOSIS — B37.2 SKIN YEAST INFECTION: ICD-10-CM

## 2024-09-04 DIAGNOSIS — Z09 HOSPITAL DISCHARGE FOLLOW-UP: Primary | ICD-10-CM

## 2024-09-04 DIAGNOSIS — Z87.442 HISTORY OF KIDNEY STONES: ICD-10-CM

## 2024-09-04 DIAGNOSIS — B37.31 VAGINAL YEAST INFECTION: ICD-10-CM

## 2024-09-04 RX ORDER — FLUCONAZOLE 150 MG/1
TABLET ORAL
Qty: 2 TABLET | Refills: 0 | Status: SHIPPED | OUTPATIENT
Start: 2024-09-04

## 2024-09-04 RX ORDER — NYSTATIN 100000 [USP'U]/G
POWDER TOPICAL
Qty: 60 G | Refills: 5 | Status: SHIPPED | OUTPATIENT
Start: 2024-09-04 | End: 2024-09-04

## 2024-09-04 RX ORDER — NYSTATIN 100000 [USP'U]/G
POWDER TOPICAL
Qty: 60 G | Refills: 5 | Status: SHIPPED | OUTPATIENT
Start: 2024-09-04

## 2024-09-04 RX ORDER — FLUCONAZOLE 150 MG/1
150 TABLET ORAL ONCE
Qty: 1 TABLET | Refills: 0 | Status: SHIPPED | OUTPATIENT
Start: 2024-09-04 | End: 2024-09-04

## 2024-09-04 SDOH — ECONOMIC STABILITY: INCOME INSECURITY: HOW HARD IS IT FOR YOU TO PAY FOR THE VERY BASICS LIKE FOOD, HOUSING, MEDICAL CARE, AND HEATING?: NOT HARD AT ALL

## 2024-09-04 SDOH — ECONOMIC STABILITY: FOOD INSECURITY: WITHIN THE PAST 12 MONTHS, THE FOOD YOU BOUGHT JUST DIDN'T LAST AND YOU DIDN'T HAVE MONEY TO GET MORE.: NEVER TRUE

## 2024-09-04 SDOH — ECONOMIC STABILITY: FOOD INSECURITY: WITHIN THE PAST 12 MONTHS, YOU WORRIED THAT YOUR FOOD WOULD RUN OUT BEFORE YOU GOT MONEY TO BUY MORE.: NEVER TRUE

## 2024-09-04 NOTE — PROGRESS NOTES
Jennifer Mae is a 64 y.o. year old female who presents today for   Chief Complaint   Patient presents with    Follow-Up from Hospital       Is someone accompanying this pt? no    Is the patient using any DME equipment during OV? no    Depression Screenin/17/2024     9:04 AM 2024     9:16 AM 2023    11:17 AM 2022     8:21 AM   PHQ-9 Questionaire   Little interest or pleasure in doing things 0  3 0   Feeling down, depressed, or hopeless 3 1 3 3   Trouble falling or staying asleep, or sleeping too much 3 3 3 3   Feeling tired or having little energy 3 3 3 3   Poor appetite or overeating 0 0 3 3   Feeling bad about yourself - or that you are a failure or have let yourself or your family down 0 0 0 0   Trouble concentrating on things, such as reading the newspaper or watching television 2 3 3 3   Moving or speaking so slowly that other people could have noticed. Or the opposite - being so fidgety or restless that you have been moving around a lot more than usual 0 0 0 0   Thoughts that you would be better off dead, or of hurting yourself in some way 0 0 0    PHQ-9 Total Score 11 10 18 15   If you checked off any problems, how difficult have these problems made it for you to do your work, take care of things at home, or get along with other people? 1  0        Abuse Screening:       No data to display                Learning Assessment:  No question data found.    Fall Risk:      2024     1:57 PM 2024     9:49 AM 10/16/2023     9:45 AM   Fall Risk   Do you feel unsteady or are you worried about falling?  no no yes   2 or more falls in past year? no yes yes   Fall with injury in past year? no no no           Coordination of Care:   1. \"Have you been to the ER, urgent care clinic since your last visit?  Hospitalized since your last visit?\" Yes was admitted due to kidney stones, awaiting surgical procedure with Urology    2. \"Have you seen or consulted any other health care providers outside 
  Height: 1.727 m (5' 8\")      Pain Score:   0 - No pain     On this date 09/04/24  have spent 30 minutes reviewing previous notes, test results and face to face with the patient discussing the diagnosis and importance of compliance with the treatment plan as well as documenting on the day of the visit.      ESTHER LeblancC

## 2024-10-17 DIAGNOSIS — L92.0 GRANULOMA ANNULARE: ICD-10-CM

## 2024-10-17 RX ORDER — TRIAMCINOLONE ACETONIDE 1 MG/G
CREAM TOPICAL 2 TIMES DAILY
Qty: 453.6 G | Refills: 3 | Status: SHIPPED | OUTPATIENT
Start: 2024-10-17

## 2024-10-17 NOTE — TELEPHONE ENCOUNTER
Medication(s) requesting:   Requested Prescriptions     Pending Prescriptions Disp Refills    triamcinolone (KENALOG) 0.1 % cream 453.6 g 3     Sig: Apply topically 2 times daily       Last office visit:  9.14.24  Next office visit DMA: 12/4/2024

## 2024-11-14 ENCOUNTER — COMMUNITY OUTREACH (OUTPATIENT)
Facility: CLINIC | Age: 64
End: 2024-11-14

## 2024-12-19 DIAGNOSIS — F41.9 ANXIETY: ICD-10-CM

## 2024-12-19 DIAGNOSIS — F32.0 MAJOR DEPRESSIVE DISORDER, SINGLE EPISODE, MILD (HCC): ICD-10-CM

## 2024-12-19 NOTE — TELEPHONE ENCOUNTER
Medication(s) requesting:   Requested Prescriptions     Pending Prescriptions Disp Refills    DULoxetine HCl 40 MG CPEP 30 capsule 3     Sig: Take 40 mg by mouth daily       Last office visit:  9/4/2024  Next office visit DMA: Visit date not found

## 2024-12-21 RX ORDER — DULOXETINE 40 MG/1
40 CAPSULE, DELAYED RELEASE ORAL DAILY
Qty: 90 CAPSULE | Refills: 1 | Status: SHIPPED | OUTPATIENT
Start: 2024-12-21

## 2024-12-23 DIAGNOSIS — K58.2 MIXED IRRITABLE BOWEL SYNDROME: ICD-10-CM

## 2024-12-23 DIAGNOSIS — K31.89 STOMACH SPASM: ICD-10-CM

## 2024-12-23 NOTE — TELEPHONE ENCOUNTER
Medication(s) requesting:   Requested Prescriptions     Pending Prescriptions Disp Refills    amitriptyline (ELAVIL) 50 MG tablet 90 tablet 3     Sig: Take 1 tablet by mouth nightly       Last office visit:  9/4/2024  Next office visit DMA: Visit date not found

## 2024-12-24 RX ORDER — AMITRIPTYLINE HYDROCHLORIDE 50 MG/1
50 TABLET ORAL NIGHTLY
Qty: 90 TABLET | Refills: 3 | Status: SHIPPED | OUTPATIENT
Start: 2024-12-24

## 2024-12-26 DIAGNOSIS — F32.0 MAJOR DEPRESSIVE DISORDER, SINGLE EPISODE, MILD (HCC): ICD-10-CM

## 2024-12-26 DIAGNOSIS — F41.9 ANXIETY: ICD-10-CM

## 2024-12-26 NOTE — TELEPHONE ENCOUNTER
Medication(s) requesting:   Requested Prescriptions     Pending Prescriptions Disp Refills    DULoxetine HCl 40 MG CPEP 90 capsule 1     Sig: Take 40 mg by mouth daily       Last office visit:  9/4/2024  Next office visit DMA: Visit date not found

## 2024-12-27 RX ORDER — DULOXETINE 40 MG/1
40 CAPSULE, DELAYED RELEASE ORAL DAILY
Qty: 90 CAPSULE | Refills: 1 | OUTPATIENT
Start: 2024-12-27

## 2025-02-10 DIAGNOSIS — E78.00 PURE HYPERCHOLESTEROLEMIA: ICD-10-CM

## 2025-02-10 DIAGNOSIS — Z00.00 PREVENTATIVE HEALTH CARE: ICD-10-CM

## 2025-02-10 NOTE — TELEPHONE ENCOUNTER
Medication(s) requesting:   Requested Prescriptions     Pending Prescriptions Disp Refills    atorvastatin (LIPITOR) 20 MG tablet 90 tablet 1     Sig: Take 1 tablet by mouth daily       Last office visit:  9/4/2024  Next office visit DMA: Visit date not found

## 2025-02-11 RX ORDER — ATORVASTATIN CALCIUM 20 MG/1
20 TABLET, FILM COATED ORAL DAILY
Qty: 90 TABLET | Refills: 1 | Status: SHIPPED | OUTPATIENT
Start: 2025-02-11

## 2025-02-26 DIAGNOSIS — U09.9 COVID-19 LONG HAULER: ICD-10-CM

## 2025-02-26 DIAGNOSIS — R06.02 SOB (SHORTNESS OF BREATH): ICD-10-CM

## 2025-02-26 DIAGNOSIS — B37.2 SKIN YEAST INFECTION: ICD-10-CM

## 2025-02-26 NOTE — TELEPHONE ENCOUNTER
Medication(s) requesting:   Requested Prescriptions     Pending Prescriptions Disp Refills    albuterol sulfate HFA (VENTOLIN HFA) 108 (90 Base) MCG/ACT inhaler 54 g 5     Sig: Inhale 2 puffs into the lungs 4 times daily as needed for Wheezing    nystatin (MYCOSTATIN) 858590 UNIT/GM powder 60 g 5     Sig: Apply 3 times daily to affected area as needed       Last office visit:  9/4/2024  Next office visit DMA: Visit date not found

## 2025-03-03 RX ORDER — NYSTATIN 100000 [USP'U]/G
POWDER TOPICAL
Qty: 60 G | Refills: 5 | Status: SHIPPED | OUTPATIENT
Start: 2025-03-03

## 2025-03-03 RX ORDER — ALBUTEROL SULFATE 90 UG/1
2 INHALANT RESPIRATORY (INHALATION) 4 TIMES DAILY PRN
Qty: 54 G | Refills: 5 | Status: SHIPPED | OUTPATIENT
Start: 2025-03-03

## 2025-06-04 ENCOUNTER — TELEPHONE (OUTPATIENT)
Dept: PHARMACY | Facility: CLINIC | Age: 65
End: 2025-06-04

## 2025-06-04 NOTE — TELEPHONE ENCOUNTER
[facility]: ADHERENCE REVIEW  Identified care gap per Thurman: fills at [facility]: Diabetes adherence    Patient also appears to be prescribed: Statin    ASSESSMENT  DIABETES ADHERENCE    Insurance Records claims through 5/26/25 (Prior Year PDC = FAILED; YTD PDC = 66%; Potential Fail Date: 6/21/25):   Metformin 1000 mg tablets last filled on 1/12/25 for 90 day supply. Next refill due: 4/12/25  Per Insurer Portal: Glipizide last filled on 1/11/25 for 90 day supply.     Per insurance portal, metformin and glipizide both filled 4/14/25 for 90 day supplies, but claims reversed    Lab Results   Component Value Date    LABA1C 5.1 07/17/2024    LABA1C 7.0 (H) 06/26/2023    LABA1C 7.6 (H) 08/23/2022     NOTE: A1c not yet completed this calendar year    STATIN ADHERENCE    Insurance Records claims through 5/26/25 (Prior Year PDC = not reported; YTD PDC = 97%; Potential Fail Date: 10/14/25):   Atorvastatin 20 mg tablets last filled on 5/15/25 for 90 day supply. Next refill due: 8/13/25    Lab Results   Component Value Date    CHOL 125 07/17/2024    TRIG 337 (H) 07/17/2024    HDL 31 (L) 07/17/2024     Lab Results   Component Value Date    LDL 43 07/17/2024      ALT   Date Value Ref Range Status   07/17/2024 15 0 - 32 IU/L Final     AST   Date Value Ref Range Status   07/17/2024 20 0 - 40 IU/L Final     The ASCVD Risk score (Neris DK, et al., 2019) failed to calculate for the following reasons:    The valid total cholesterol range is 130 to 320 mg/dL     PLAN  The following are interventions that have been identified:   Patient OVERDUE refilling metformin and glipizide and active on home medication list.     Attempting to reach patient to review.  Left message asking for return call. [employer] message sent to patient.    Last Visit: 9/4/24  Future Appointments   Date Time Provider Department Center   12/12/2025  9:15 AM [facility] XRAY2 [facility] [place] Sched   12/12/2025  9:30 AM [facility] ULTRASOUND [facility]

## 2025-06-26 DIAGNOSIS — F41.9 ANXIETY: ICD-10-CM

## 2025-06-26 DIAGNOSIS — F32.0 MAJOR DEPRESSIVE DISORDER, SINGLE EPISODE, MILD: ICD-10-CM

## 2025-07-01 RX ORDER — DULOXETINE 40 MG/1
40 CAPSULE, DELAYED RELEASE ORAL DAILY
Qty: 60 CAPSULE | Refills: 0 | Status: SHIPPED | OUTPATIENT
Start: 2025-07-01

## 2025-07-03 ENCOUNTER — TELEPHONE (OUTPATIENT)
Facility: CLINIC | Age: 65
End: 2025-07-03

## 2025-07-03 NOTE — TELEPHONE ENCOUNTER
LVM on mobile @ 3:22 pm to request patient to follow up in officer for chronic condition. Patient medication refill was authorized for 60 day supply for Duloxetine. Please be advised, thank you.

## 2025-08-15 SDOH — ECONOMIC STABILITY: TRANSPORTATION INSECURITY
IN THE PAST 12 MONTHS, HAS THE LACK OF TRANSPORTATION KEPT YOU FROM MEDICAL APPOINTMENTS OR FROM GETTING MEDICATIONS?: NO

## 2025-08-15 SDOH — ECONOMIC STABILITY: FOOD INSECURITY: WITHIN THE PAST 12 MONTHS, THE FOOD YOU BOUGHT JUST DIDN'T LAST AND YOU DIDN'T HAVE MONEY TO GET MORE.: NEVER TRUE

## 2025-08-15 SDOH — ECONOMIC STABILITY: FOOD INSECURITY: WITHIN THE PAST 12 MONTHS, YOU WORRIED THAT YOUR FOOD WOULD RUN OUT BEFORE YOU GOT MONEY TO BUY MORE.: NEVER TRUE

## 2025-08-15 SDOH — ECONOMIC STABILITY: INCOME INSECURITY: IN THE LAST 12 MONTHS, WAS THERE A TIME WHEN YOU WERE NOT ABLE TO PAY THE MORTGAGE OR RENT ON TIME?: NO

## 2025-08-22 ENCOUNTER — OFFICE VISIT (OUTPATIENT)
Facility: CLINIC | Age: 65
End: 2025-08-22

## 2025-08-22 VITALS
WEIGHT: 193 LBS | SYSTOLIC BLOOD PRESSURE: 139 MMHG | DIASTOLIC BLOOD PRESSURE: 80 MMHG | OXYGEN SATURATION: 94 % | RESPIRATION RATE: 16 BRPM | BODY MASS INDEX: 29.25 KG/M2 | HEIGHT: 68 IN | HEART RATE: 79 BPM | TEMPERATURE: 98.2 F

## 2025-08-22 DIAGNOSIS — F32.0 MAJOR DEPRESSIVE DISORDER, SINGLE EPISODE, MILD: ICD-10-CM

## 2025-08-22 DIAGNOSIS — R07.9 CHEST PAIN, UNSPECIFIED TYPE: ICD-10-CM

## 2025-08-22 DIAGNOSIS — Z12.11 SCREENING FOR COLORECTAL CANCER: ICD-10-CM

## 2025-08-22 DIAGNOSIS — Z23 IMMUNIZATION DUE: ICD-10-CM

## 2025-08-22 DIAGNOSIS — E11.21 TYPE 2 DIABETES WITH NEPHROPATHY (HCC): ICD-10-CM

## 2025-08-22 DIAGNOSIS — Z00.00 PREVENTATIVE HEALTH CARE: ICD-10-CM

## 2025-08-22 DIAGNOSIS — E11.22 TYPE 2 DIABETES MELLITUS WITH CHRONIC KIDNEY DISEASE, WITHOUT LONG-TERM CURRENT USE OF INSULIN, UNSPECIFIED CKD STAGE (HCC): ICD-10-CM

## 2025-08-22 DIAGNOSIS — K21.9 GASTROESOPHAGEAL REFLUX DISEASE WITHOUT ESOPHAGITIS: ICD-10-CM

## 2025-08-22 DIAGNOSIS — Z82.49 FAMILY HISTORY OF HEART DISEASE: ICD-10-CM

## 2025-08-22 DIAGNOSIS — Z13.89 SCREENING FOR BLOOD OR PROTEIN IN URINE: ICD-10-CM

## 2025-08-22 DIAGNOSIS — E78.00 PURE HYPERCHOLESTEROLEMIA: ICD-10-CM

## 2025-08-22 DIAGNOSIS — Z00.00 MEDICARE ANNUAL WELLNESS VISIT, SUBSEQUENT: Primary | ICD-10-CM

## 2025-08-22 DIAGNOSIS — J06.9 VIRAL URI: ICD-10-CM

## 2025-08-22 DIAGNOSIS — Z13.29 SCREENING FOR THYROID DISORDER: ICD-10-CM

## 2025-08-22 DIAGNOSIS — Z53.20 BREAST CANCER SCREENING DECLINED: ICD-10-CM

## 2025-08-22 DIAGNOSIS — R05.2 SUBACUTE COUGH: ICD-10-CM

## 2025-08-22 DIAGNOSIS — Z86.16 HISTORY OF COVID-19: ICD-10-CM

## 2025-08-22 DIAGNOSIS — B18.2 CHRONIC HEPATITIS C WITHOUT HEPATIC COMA (HCC): ICD-10-CM

## 2025-08-22 DIAGNOSIS — Z13.0 SCREENING FOR DEFICIENCY ANEMIA: ICD-10-CM

## 2025-08-22 DIAGNOSIS — K58.2 MIXED IRRITABLE BOWEL SYNDROME: ICD-10-CM

## 2025-08-22 DIAGNOSIS — Z13.228 SCREENING FOR METABOLIC DISORDER: ICD-10-CM

## 2025-08-22 DIAGNOSIS — R06.02 SOB (SHORTNESS OF BREATH): ICD-10-CM

## 2025-08-22 DIAGNOSIS — Z13.220 SCREENING FOR CHOLESTEROL LEVEL: ICD-10-CM

## 2025-08-22 DIAGNOSIS — N39.0 URINARY TRACT INFECTION WITHOUT HEMATURIA, SITE UNSPECIFIED: ICD-10-CM

## 2025-08-22 DIAGNOSIS — K31.89 STOMACH SPASM: ICD-10-CM

## 2025-08-22 DIAGNOSIS — F41.9 ANXIETY: ICD-10-CM

## 2025-08-22 DIAGNOSIS — Z12.12 SCREENING FOR COLORECTAL CANCER: ICD-10-CM

## 2025-08-22 LAB — HBA1C MFR BLD: 8.4 %

## 2025-08-22 RX ORDER — ATORVASTATIN CALCIUM 20 MG/1
20 TABLET, FILM COATED ORAL DAILY
Qty: 90 TABLET | Refills: 1 | Status: SHIPPED | OUTPATIENT
Start: 2025-08-22

## 2025-08-22 RX ORDER — GLIPIZIDE 5 MG/1
TABLET ORAL
Qty: 180 TABLET | Refills: 1 | Status: SHIPPED | OUTPATIENT
Start: 2025-08-22

## 2025-08-22 RX ORDER — DULOXETIN HYDROCHLORIDE 30 MG/1
30 CAPSULE, DELAYED RELEASE ORAL DAILY
Qty: 90 CAPSULE | Refills: 1 | Status: SHIPPED | OUTPATIENT
Start: 2025-08-22

## 2025-08-22 RX ORDER — BUPROPION HYDROCHLORIDE 150 MG/1
150 TABLET ORAL EVERY MORNING
Qty: 30 TABLET | Refills: 3 | Status: SHIPPED | OUTPATIENT
Start: 2025-08-22

## 2025-08-22 RX ORDER — ALBUTEROL SULFATE 90 UG/1
2 INHALANT RESPIRATORY (INHALATION) 4 TIMES DAILY PRN
Qty: 54 G | Refills: 5 | Status: SHIPPED | OUTPATIENT
Start: 2025-08-22

## 2025-08-22 RX ORDER — AMITRIPTYLINE HYDROCHLORIDE 50 MG/1
50 TABLET ORAL NIGHTLY
Qty: 90 TABLET | Refills: 1 | Status: SHIPPED | OUTPATIENT
Start: 2025-08-22

## 2025-08-22 RX ORDER — PANTOPRAZOLE SODIUM 20 MG/1
20 TABLET, DELAYED RELEASE ORAL
Qty: 30 TABLET | Refills: 2 | Status: SHIPPED | OUTPATIENT
Start: 2025-08-22

## 2025-08-22 SDOH — ECONOMIC STABILITY: FOOD INSECURITY: WITHIN THE PAST 12 MONTHS, THE FOOD YOU BOUGHT JUST DIDN'T LAST AND YOU DIDN'T HAVE MONEY TO GET MORE.: NEVER TRUE

## 2025-08-22 SDOH — ECONOMIC STABILITY: FOOD INSECURITY: WITHIN THE PAST 12 MONTHS, YOU WORRIED THAT YOUR FOOD WOULD RUN OUT BEFORE YOU GOT MONEY TO BUY MORE.: NEVER TRUE

## 2025-08-22 ASSESSMENT — LIFESTYLE VARIABLES
HOW OFTEN DO YOU HAVE A DRINK CONTAINING ALCOHOL: 2-3 TIMES A WEEK
HOW MANY STANDARD DRINKS CONTAINING ALCOHOL DO YOU HAVE ON A TYPICAL DAY: 1 OR 2

## 2025-08-22 ASSESSMENT — PATIENT HEALTH QUESTIONNAIRE - PHQ9
SUM OF ALL RESPONSES TO PHQ QUESTIONS 1-9: 0
3. TROUBLE FALLING OR STAYING ASLEEP: NOT AT ALL
9. THOUGHTS THAT YOU WOULD BE BETTER OFF DEAD, OR OF HURTING YOURSELF: NOT AT ALL
10. IF YOU CHECKED OFF ANY PROBLEMS, HOW DIFFICULT HAVE THESE PROBLEMS MADE IT FOR YOU TO DO YOUR WORK, TAKE CARE OF THINGS AT HOME, OR GET ALONG WITH OTHER PEOPLE: NOT DIFFICULT AT ALL
2. FEELING DOWN, DEPRESSED OR HOPELESS: NOT AT ALL
8. MOVING OR SPEAKING SO SLOWLY THAT OTHER PEOPLE COULD HAVE NOTICED. OR THE OPPOSITE, BEING SO FIGETY OR RESTLESS THAT YOU HAVE BEEN MOVING AROUND A LOT MORE THAN USUAL: NOT AT ALL
4. FEELING TIRED OR HAVING LITTLE ENERGY: NOT AT ALL
6. FEELING BAD ABOUT YOURSELF - OR THAT YOU ARE A FAILURE OR HAVE LET YOURSELF OR YOUR FAMILY DOWN: NOT AT ALL
1. LITTLE INTEREST OR PLEASURE IN DOING THINGS: NOT AT ALL
SUM OF ALL RESPONSES TO PHQ QUESTIONS 1-9: 0
SUM OF ALL RESPONSES TO PHQ QUESTIONS 1-9: 0
7. TROUBLE CONCENTRATING ON THINGS, SUCH AS READING THE NEWSPAPER OR WATCHING TELEVISION: NOT AT ALL
SUM OF ALL RESPONSES TO PHQ QUESTIONS 1-9: 0
5. POOR APPETITE OR OVEREATING: NOT AT ALL

## 2025-08-23 LAB
ALBUMIN SERPL-MCNC: 4.5 G/DL (ref 3.9–4.9)
ALBUMIN/CREAT UR: 12 MG/G CREAT (ref 0–29)
ALP SERPL-CCNC: 104 IU/L (ref 44–121)
ALT SERPL-CCNC: 12 IU/L (ref 0–32)
APPEARANCE UR: CLEAR
AST SERPL-CCNC: 14 IU/L (ref 0–40)
BACTERIA #/AREA URNS HPF: ABNORMAL /[HPF]
BASOPHILS # BLD AUTO: 0.1 X10E3/UL (ref 0–0.2)
BASOPHILS NFR BLD AUTO: 1 %
BILIRUB SERPL-MCNC: 0.8 MG/DL (ref 0–1.2)
BILIRUB UR QL STRIP: NEGATIVE
BUN SERPL-MCNC: 9 MG/DL (ref 8–27)
BUN/CREAT SERPL: 9 (ref 12–28)
CALCIUM SERPL-MCNC: 9 MG/DL (ref 8.7–10.3)
CASTS URNS QL MICRO: ABNORMAL /LPF
CHLORIDE SERPL-SCNC: 102 MMOL/L (ref 96–106)
CHOLEST SERPL-MCNC: 130 MG/DL (ref 100–199)
CO2 SERPL-SCNC: 22 MMOL/L (ref 20–29)
COLOR UR: YELLOW
CREAT SERPL-MCNC: 1.01 MG/DL (ref 0.57–1)
CREAT UR-MCNC: 41.8 MG/DL
EGFRCR SERPLBLD CKD-EPI 2021: 61 ML/MIN/1.73
EOSINOPHIL # BLD AUTO: 0.3 X10E3/UL (ref 0–0.4)
EOSINOPHIL NFR BLD AUTO: 2 %
EPI CELLS #/AREA URNS HPF: ABNORMAL /HPF (ref 0–10)
ERYTHROCYTE [DISTWIDTH] IN BLOOD BY AUTOMATED COUNT: 11.8 % (ref 11.7–15.4)
GLOBULIN SER CALC-MCNC: 2.3 G/DL (ref 1.5–4.5)
GLUCOSE SERPL-MCNC: 271 MG/DL (ref 70–99)
GLUCOSE UR QL STRIP: ABNORMAL
HCT VFR BLD AUTO: 43.4 % (ref 34–46.6)
HDLC SERPL-MCNC: 32 MG/DL
HGB BLD-MCNC: 14.1 G/DL (ref 11.1–15.9)
HGB UR QL STRIP: NEGATIVE
IMM GRANULOCYTES # BLD AUTO: 0.1 X10E3/UL (ref 0–0.1)
IMM GRANULOCYTES NFR BLD AUTO: 1 %
KETONES UR QL STRIP: NEGATIVE
LDLC SERPL CALC-MCNC: 51 MG/DL (ref 0–99)
LEUKOCYTE ESTERASE UR QL STRIP: ABNORMAL
LYMPHOCYTES # BLD AUTO: 1.9 X10E3/UL (ref 0.7–3.1)
LYMPHOCYTES NFR BLD AUTO: 16 %
MCH RBC QN AUTO: 32.2 PG (ref 26.6–33)
MCHC RBC AUTO-ENTMCNC: 32.5 G/DL (ref 31.5–35.7)
MCV RBC AUTO: 99 FL (ref 79–97)
MICRO URNS: ABNORMAL
MICROALBUMIN UR-MCNC: 5 UG/ML
MONOCYTES # BLD AUTO: 0.8 X10E3/UL (ref 0.1–0.9)
MONOCYTES NFR BLD AUTO: 7 %
NEUTROPHILS # BLD AUTO: 8.7 X10E3/UL (ref 1.4–7)
NEUTROPHILS NFR BLD AUTO: 73 %
NITRITE UR QL STRIP: POSITIVE
PH UR STRIP: 6.5 [PH] (ref 5–7.5)
PLATELET # BLD AUTO: 231 X10E3/UL (ref 150–450)
POTASSIUM SERPL-SCNC: 4.6 MMOL/L (ref 3.5–5.2)
PROT SERPL-MCNC: 6.8 G/DL (ref 6–8.5)
PROT UR QL STRIP: NEGATIVE
RBC # BLD AUTO: 4.38 X10E6/UL (ref 3.77–5.28)
RBC #/AREA URNS HPF: ABNORMAL /HPF (ref 0–2)
SODIUM SERPL-SCNC: 137 MMOL/L (ref 134–144)
SP GR UR STRIP: 1.02 (ref 1–1.03)
TRIGL SERPL-MCNC: 301 MG/DL (ref 0–149)
TSH SERPL DL<=0.005 MIU/L-ACNC: 2.66 UIU/ML (ref 0.45–4.5)
UROBILINOGEN UR STRIP-MCNC: 1 MG/DL (ref 0.2–1)
VLDLC SERPL CALC-MCNC: 47 MG/DL (ref 5–40)
WBC # BLD AUTO: 11.8 X10E3/UL (ref 3.4–10.8)
WBC #/AREA URNS HPF: ABNORMAL /HPF (ref 0–5)

## 2025-08-26 RX ORDER — CEPHALEXIN 500 MG/1
500 CAPSULE ORAL 2 TIMES DAILY
Qty: 14 CAPSULE | Refills: 0 | Status: SHIPPED | OUTPATIENT
Start: 2025-08-26 | End: 2025-09-02